# Patient Record
Sex: FEMALE | Race: WHITE | Employment: UNEMPLOYED | URBAN - METROPOLITAN AREA
[De-identification: names, ages, dates, MRNs, and addresses within clinical notes are randomized per-mention and may not be internally consistent; named-entity substitution may affect disease eponyms.]

---

## 2018-01-30 ENCOUNTER — HOSPITAL ENCOUNTER (OUTPATIENT)
Dept: MAMMOGRAPHY | Age: 63
Discharge: HOME | End: 2018-01-30
Attending: OBSTETRICS & GYNECOLOGY
Payer: MEDICARE

## 2018-01-30 DIAGNOSIS — Z12.31 VISIT FOR SCREENING MAMMOGRAM: ICD-10-CM

## 2018-01-30 PROCEDURE — 77067 SCR MAMMO BI INCL CAD: CPT

## 2019-04-19 ENCOUNTER — HOSPITAL ENCOUNTER (EMERGENCY)
Age: 64
Discharge: HOME | End: 2019-04-19
Attending: EMERGENCY MEDICINE
Payer: MEDICARE

## 2019-04-19 ENCOUNTER — APPOINTMENT (OUTPATIENT)
Dept: CT IMAGING | Age: 64
End: 2019-04-19
Attending: NURSE PRACTITIONER
Payer: MEDICARE

## 2019-04-19 VITALS
TEMPERATURE: 97.9 F | WEIGHT: 182.1 LBS | RESPIRATION RATE: 18 BRPM | DIASTOLIC BLOOD PRESSURE: 98 MMHG | OXYGEN SATURATION: 97 % | HEART RATE: 65 BPM | BODY MASS INDEX: 26.97 KG/M2 | SYSTOLIC BLOOD PRESSURE: 138 MMHG | HEIGHT: 69 IN

## 2019-04-19 DIAGNOSIS — K85.90 ACUTE PANCREATITIS WITHOUT INFECTION OR NECROSIS, UNSPECIFIED PANCREATITIS TYPE: Primary | ICD-10-CM

## 2019-04-19 LAB
ALBUMIN SERPL-MCNC: 3.7 G/DL (ref 3.5–5)
ALBUMIN/GLOB SERPL: 0.9 {RATIO} (ref 1.1–2.2)
ALP SERPL-CCNC: 89 U/L (ref 45–117)
ALT SERPL-CCNC: 21 U/L (ref 12–78)
ANION GAP SERPL CALC-SCNC: 8 MMOL/L (ref 5–15)
APPEARANCE UR: CLEAR
AST SERPL-CCNC: 13 U/L (ref 15–37)
BACTERIA URNS QL MICRO: NEGATIVE /HPF
BASOPHILS # BLD: 0 K/UL (ref 0–0.1)
BASOPHILS NFR BLD: 0 % (ref 0–1)
BILIRUB SERPL-MCNC: 0.3 MG/DL (ref 0.2–1)
BILIRUB UR QL: NEGATIVE
BUN SERPL-MCNC: 12 MG/DL (ref 6–20)
BUN/CREAT SERPL: 18 (ref 12–20)
CALCIUM SERPL-MCNC: 10.2 MG/DL (ref 8.5–10.1)
CHLORIDE SERPL-SCNC: 103 MMOL/L (ref 97–108)
CO2 SERPL-SCNC: 27 MMOL/L (ref 21–32)
COLOR UR: NORMAL
CREAT SERPL-MCNC: 0.67 MG/DL (ref 0.55–1.02)
DIFFERENTIAL METHOD BLD: ABNORMAL
EOSINOPHIL # BLD: 0.1 K/UL (ref 0–0.4)
EOSINOPHIL NFR BLD: 1 % (ref 0–7)
EPITH CASTS URNS QL MICRO: NORMAL /LPF
ERYTHROCYTE [DISTWIDTH] IN BLOOD BY AUTOMATED COUNT: 13.4 % (ref 11.5–14.5)
GLOBULIN SER CALC-MCNC: 4 G/DL (ref 2–4)
GLUCOSE SERPL-MCNC: 151 MG/DL (ref 65–100)
GLUCOSE UR STRIP.AUTO-MCNC: NEGATIVE MG/DL
HCT VFR BLD AUTO: 38.3 % (ref 35–47)
HGB BLD-MCNC: 12.5 G/DL (ref 11.5–16)
HGB UR QL STRIP: NEGATIVE
HYALINE CASTS URNS QL MICRO: NORMAL /LPF (ref 0–5)
IMM GRANULOCYTES # BLD AUTO: 0 K/UL (ref 0–0.04)
IMM GRANULOCYTES NFR BLD AUTO: 0 % (ref 0–0.5)
KETONES UR QL STRIP.AUTO: NEGATIVE MG/DL
LEUKOCYTE ESTERASE UR QL STRIP.AUTO: NEGATIVE
LIPASE SERPL-CCNC: 228 U/L (ref 73–393)
LYMPHOCYTES # BLD: 2.9 K/UL (ref 0.8–3.5)
LYMPHOCYTES NFR BLD: 38 % (ref 12–49)
MCH RBC QN AUTO: 27.6 PG (ref 26–34)
MCHC RBC AUTO-ENTMCNC: 32.6 G/DL (ref 30–36.5)
MCV RBC AUTO: 84.5 FL (ref 80–99)
MONOCYTES # BLD: 0.5 K/UL (ref 0–1)
MONOCYTES NFR BLD: 6 % (ref 5–13)
NEUTS SEG # BLD: 4.1 K/UL (ref 1.8–8)
NEUTS SEG NFR BLD: 55 % (ref 32–75)
NITRITE UR QL STRIP.AUTO: NEGATIVE
NRBC # BLD: 0 K/UL (ref 0–0.01)
NRBC BLD-RTO: 0 PER 100 WBC
PH UR STRIP: 5.5 [PH] (ref 5–8)
PLATELET # BLD AUTO: 355 K/UL (ref 150–400)
PMV BLD AUTO: 8.7 FL (ref 8.9–12.9)
POTASSIUM SERPL-SCNC: 4 MMOL/L (ref 3.5–5.1)
PROT SERPL-MCNC: 7.7 G/DL (ref 6.4–8.2)
PROT UR STRIP-MCNC: NEGATIVE MG/DL
RBC # BLD AUTO: 4.53 M/UL (ref 3.8–5.2)
RBC #/AREA URNS HPF: NORMAL /HPF (ref 0–5)
SODIUM SERPL-SCNC: 138 MMOL/L (ref 136–145)
SP GR UR REFRACTOMETRY: 1.01 (ref 1–1.03)
UA: UC IF INDICATED,UAUC: NORMAL
UROBILINOGEN UR QL STRIP.AUTO: 0.2 EU/DL (ref 0.2–1)
WBC # BLD AUTO: 7.6 K/UL (ref 3.6–11)
WBC URNS QL MICRO: NORMAL /HPF (ref 0–4)

## 2019-04-19 PROCEDURE — 74177 CT ABD & PELVIS W/CONTRAST: CPT

## 2019-04-19 PROCEDURE — 83690 ASSAY OF LIPASE: CPT

## 2019-04-19 PROCEDURE — 81001 URINALYSIS AUTO W/SCOPE: CPT

## 2019-04-19 PROCEDURE — 96361 HYDRATE IV INFUSION ADD-ON: CPT

## 2019-04-19 PROCEDURE — 96375 TX/PRO/DX INJ NEW DRUG ADDON: CPT

## 2019-04-19 PROCEDURE — 74011000250 HC RX REV CODE- 250: Performed by: NURSE PRACTITIONER

## 2019-04-19 PROCEDURE — 74011636320 HC RX REV CODE- 636/320: Performed by: EMERGENCY MEDICINE

## 2019-04-19 PROCEDURE — 96374 THER/PROPH/DIAG INJ IV PUSH: CPT

## 2019-04-19 PROCEDURE — 85025 COMPLETE CBC W/AUTO DIFF WBC: CPT

## 2019-04-19 PROCEDURE — 99283 EMERGENCY DEPT VISIT LOW MDM: CPT

## 2019-04-19 PROCEDURE — 80053 COMPREHEN METABOLIC PANEL: CPT

## 2019-04-19 PROCEDURE — 74011250636 HC RX REV CODE- 250/636: Performed by: NURSE PRACTITIONER

## 2019-04-19 PROCEDURE — 36415 COLL VENOUS BLD VENIPUNCTURE: CPT

## 2019-04-19 PROCEDURE — 74011250637 HC RX REV CODE- 250/637: Performed by: NURSE PRACTITIONER

## 2019-04-19 RX ORDER — FENTANYL CITRATE 50 UG/ML
50 INJECTION, SOLUTION INTRAMUSCULAR; INTRAVENOUS
Status: COMPLETED | OUTPATIENT
Start: 2019-04-19 | End: 2019-04-19

## 2019-04-19 RX ORDER — SODIUM CHLORIDE 0.9 % (FLUSH) 0.9 %
10 SYRINGE (ML) INJECTION
Status: COMPLETED | OUTPATIENT
Start: 2019-04-19 | End: 2019-04-19

## 2019-04-19 RX ORDER — OXYCODONE AND ACETAMINOPHEN 5; 325 MG/1; MG/1
2 TABLET ORAL
Status: COMPLETED | OUTPATIENT
Start: 2019-04-19 | End: 2019-04-19

## 2019-04-19 RX ORDER — OXYCODONE AND ACETAMINOPHEN 5; 325 MG/1; MG/1
1 TABLET ORAL
Qty: 30 TAB | Refills: 0 | Status: SHIPPED | OUTPATIENT
Start: 2019-04-19 | End: 2019-04-29

## 2019-04-19 RX ORDER — ONDANSETRON 2 MG/ML
4 INJECTION INTRAMUSCULAR; INTRAVENOUS
Status: COMPLETED | OUTPATIENT
Start: 2019-04-19 | End: 2019-04-19

## 2019-04-19 RX ORDER — ONDANSETRON 4 MG/1
4 TABLET, ORALLY DISINTEGRATING ORAL
Qty: 20 TAB | Refills: 0 | Status: SHIPPED | OUTPATIENT
Start: 2019-04-19 | End: 2019-04-25

## 2019-04-19 RX ADMIN — FAMOTIDINE 20 MG: 10 INJECTION, SOLUTION INTRAVENOUS at 20:36

## 2019-04-19 RX ADMIN — IOPAMIDOL 100 ML: 755 INJECTION, SOLUTION INTRAVENOUS at 21:38

## 2019-04-19 RX ADMIN — SODIUM CHLORIDE 1000 ML: 900 INJECTION, SOLUTION INTRAVENOUS at 20:14

## 2019-04-19 RX ADMIN — FENTANYL CITRATE 50 MCG: 50 INJECTION, SOLUTION INTRAMUSCULAR; INTRAVENOUS at 20:36

## 2019-04-19 RX ADMIN — Medication 10 ML: at 21:38

## 2019-04-19 RX ADMIN — ONDANSETRON 4 MG: 2 INJECTION INTRAMUSCULAR; INTRAVENOUS at 20:35

## 2019-04-19 RX ADMIN — OXYCODONE AND ACETAMINOPHEN 2 TABLET: 5; 325 TABLET ORAL at 22:21

## 2019-04-19 NOTE — ED TRIAGE NOTES
Pt presents from home via walk in triage c/o upper mid epigastric pain. Onset 4 days ago with burning sensation. Denies nausea or vomiting. S/p cholecysectomy, appendectomy, and hysterectomy.

## 2019-04-20 NOTE — ED PROVIDER NOTES
EMERGENCY DEPARTMENT HISTORY AND PHYSICAL EXAM 
 
 
Date: 4/19/2019 Patient Name: Willis Chavez History of Presenting Illness Chief Complaint Patient presents with  Abdominal Pain x4 days in the middle of her abdomen. Nausea with no vomiting. Still has gallbladder. Denies urinary symtpoms History Provided By: Patient HPI: Willis Chavez, 59 y.o. female with PMHx significant for diabetes, MS, tobacco use disorder, GERD, skin cancer, chronic pain, depression and anxiety, presents ambulatory to the ED with cc of epigastric abdominal pain that has been constant for the last 3 to 4 days. Patient has tried Tums for pain without relief. She states that she has never had pain like this in the past.  No recent travel or consumption of raw seafood. Associated symptoms include anorexia. Nothing makes it better nothing makes it worse. Pt denies fevers, chills, night sweats, chest pain, pressure, SOB, FRYE, PND, orthopnea,  n/v/d, melena, hematuria, dysuria, constipation, HA, dizziness, and syncope There are no other complaints, changes, or physical findings at this time. PCP: Jareth Morris., MD 
 
No current facility-administered medications on file prior to encounter. Current Outpatient Medications on File Prior to Encounter Medication Sig Dispense Refill  azithromycin (ZITHROMAX) 250 mg tablet Take 2 tabs po then one tab po q day for four days. 6 Tab 0  
 ondansetron hcl (ZOFRAN, AS HYDROCHLORIDE,) 4 mg tablet Take 1 Tab by mouth every eight (8) hours as needed for Nausea for up to 12 doses. 12 Tab 2  
 ofloxacin (FLOXIN) 0.3 % otic solution Administer 5 Drops in right ear two (2) times a day. 10 mL 0  
 FLUoxetine (PROZAC) 20 mg tablet Take 60 mg by mouth daily.  metFORMIN (GLUCOPHAGE) 500 mg tablet Take  by mouth two (2) times daily (with meals).     
 insulin glargine (LANTUS) 100 unit/mL injection by SubCUTAneous route nightly. 10 UNITS IN A.M. AND 25 UNITS AT [facility]  Liraglutide (VICTOZA) 0.6 mg/0.1 mL (18 mg/3 mL) sub-q pen 1.8 mg by SubCUTAneous route daily.  rosuvastatin (CRESTOR) 20 mg tablet Take 20 mg by mouth daily.  gabapentin (NEURONTIN) 400 mg capsule Take 800 mg by mouth two (2) times a day.  clonazepam (KLONOPIN) 2 mg tablet Take 4 mg by mouth nightly. Past History Past Medical History: 
Past Medical History:  
Diagnosis Date  Cancer ([facility] SKIN-LABIA  Chronic pain  Depression with anxiety  Diabetes ([facility]  GERD (gastroesophageal reflux disease)  Labial lesion   
 precancerous, resected  Multiple sclerosis ([facility]  Psychiatric disorder DEPRESSION/ ANXIETY  Tobacco abuse Past Surgical History: 
Past Surgical History:  
Procedure Laterality Date  HX APPENDECTOMY  HX  SECTION    
 HX CHOLECYSTECTOMY  HX HERNIA REPAIR    
 HX HYSTERECTOMY Family History: 
Family History Problem Relation Age of Onset  Heart Disease Father  Diabetes Mother  Parkinsonism Mother  COPD Mother  Breast Cancer Paternal Grandmother   
     over 48  Anesth Problems Neg Hx Social History: 
Social History Tobacco Use  Smoking status: Current Every Day Smoker Packs/day: 0.50 Years: 35.00 Pack years: 17.50 Types: Cigarettes  Smokeless tobacco: Never Used Substance Use Topics  Alcohol use: No  
 Drug use: No  
 
 
Allergies: Allergies Allergen Reactions  Pcn [Penicillins] Rash Review of Systems Review of Systems Constitutional: Negative for activity change, appetite change, chills, diaphoresis, fatigue, fever and unexpected weight change. HENT: Negative for congestion, ear pain, rhinorrhea, sinus pressure, sore throat and tinnitus. Eyes: Negative for photophobia, pain, discharge and visual disturbance. Respiratory: Negative for apnea, cough, choking, chest tightness, shortness of breath, wheezing and stridor. Cardiovascular: Negative for chest pain, palpitations and leg swelling. Gastrointestinal: Positive for abdominal distention. Negative for abdominal pain, constipation, diarrhea, nausea and vomiting. Endocrine: Negative for polydipsia, polyphagia and polyuria. Genitourinary: Negative for decreased urine volume, dyspareunia, dysuria, enuresis, flank pain, frequency, hematuria and urgency. Musculoskeletal: Negative for arthralgias, back pain, gait problem, myalgias and neck pain. Skin: Negative for color change, pallor, rash and wound. Allergic/Immunologic: Negative for immunocompromised state. Neurological: Negative for dizziness, seizures, syncope, weakness, light-headedness and headaches. Hematological: Does not bruise/bleed easily. Psychiatric/Behavioral: Negative for agitation and confusion. The patient is not nervous/anxious. Physical Exam  
Physical Exam  
Constitutional: She is oriented to person, place, and time. She appears well-developed and well-nourished. No distress. HENT:  
Head: Normocephalic. Right Ear: External ear normal.  
Left Ear: External ear normal.  
Mouth/Throat: Oropharynx is clear and moist. No oropharyngeal exudate. Eyes: Pupils are equal, round, and reactive to light. Conjunctivae and EOM are normal. Right eye exhibits no discharge. Left eye exhibits no discharge. No scleral icterus. Neck: Normal range of motion. Neck supple. No JVD present. No tracheal deviation present. No thyromegaly present. Cardiovascular: Normal rate, regular rhythm, normal heart sounds and intact distal pulses. Exam reveals no gallop and no friction rub. No murmur heard. Pulmonary/Chest: Effort normal and breath sounds normal. No stridor. No respiratory distress. She has no wheezes. She has no rales. She exhibits no tenderness. Abdominal: Soft. Bowel sounds are normal. She exhibits no distension and no mass. There is tenderness in the epigastric area. There is no rigidity, no rebound, no guarding, no tenderness at McBurney's point and negative Alexis's sign. Musculoskeletal: Normal range of motion. She exhibits no edema or tenderness. Lymphadenopathy:  
  She has no cervical adenopathy. Neurological: She is alert and oriented to person, place, and time. She displays normal reflexes. No cranial nerve deficit. Coordination normal.  
Skin: Skin is warm and dry. No rash noted. She is not diaphoretic. No erythema. No pallor. Psychiatric: She has a normal mood and affect. Her behavior is normal. Judgment and thought content normal.  
Nursing note and vitals reviewed. Diagnostic Study Results Labs - Recent Results (from the past 12 hour(s)) CBC WITH AUTOMATED DIFF Collection Time: 04/19/19  8:10 PM  
Result Value Ref Range WBC 7.6 3.6 - 11.0 K/uL  
 RBC 4.53 3.80 - 5.20 M/uL  
 HGB 12.5 11.5 - 16.0 g/dL HCT 38.3 35.0 - 47.0 % MCV 84.5 80.0 - 99.0 FL  
 MCH 27.6 26.0 - 34.0 PG  
 MCHC 32.6 30.0 - 36.5 g/dL  
 RDW 13.4 11.5 - 14.5 % PLATELET 575 403 - 065 K/uL MPV 8.7 (L) 8.9 - 12.9 FL  
 NRBC 0.0 0  WBC ABSOLUTE NRBC 0.00 0.00 - 0.01 K/uL NEUTROPHILS 55 32 - 75 % LYMPHOCYTES 38 12 - 49 % MONOCYTES 6 5 - 13 % EOSINOPHILS 1 0 - 7 % BASOPHILS 0 0 - 1 % IMMATURE GRANULOCYTES 0 0.0 - 0.5 % ABS. NEUTROPHILS 4.1 1.8 - 8.0 K/UL  
 ABS. LYMPHOCYTES 2.9 0.8 - 3.5 K/UL  
 ABS. MONOCYTES 0.5 0.0 - 1.0 K/UL  
 ABS. EOSINOPHILS 0.1 0.0 - 0.4 K/UL  
 ABS. BASOPHILS 0.0 0.0 - 0.1 K/UL  
 ABS. IMM. GRANS. 0.0 0.00 - 0.04 K/UL  
 DF AUTOMATED METABOLIC PANEL, COMPREHENSIVE Collection Time: 04/19/19  8:10 PM  
Result Value Ref Range Sodium 138 136 - 145 mmol/L Potassium 4.0 3.5 - 5.1 mmol/L Chloride 103 97 - 108 mmol/L  
 CO2 27 21 - 32 mmol/L  Anion gap 8 5 - 15 mmol/L  
 Glucose 151 (H) 65 - 100 mg/dL BUN 12 6 - 20 MG/DL Creatinine 0.67 0.55 - 1.02 MG/DL  
 BUN/Creatinine ratio 18 12 - 20 GFR est AA >60 >60 ml/min/1.73m2 GFR est non-AA >60 >60 ml/min/1.73m2 Calcium 10.2 (H) 8.5 - 10.1 MG/DL Bilirubin, total 0.3 0.2 - 1.0 MG/DL  
 ALT (SGPT) 21 12 - 78 U/L  
 AST (SGOT) 13 (L) 15 - 37 U/L Alk. phosphatase 89 45 - 117 U/L Protein, total 7.7 6.4 - 8.2 g/dL Albumin 3.7 3.5 - 5.0 g/dL Globulin 4.0 2.0 - 4.0 g/dL A-G Ratio 0.9 (L) 1.1 - 2.2 LIPASE Collection Time: 04/19/19  8:10 PM  
Result Value Ref Range Lipase 228 73 - 393 U/L  
URINALYSIS W/ REFLEX CULTURE Collection Time: 04/19/19  9:43 PM  
Result Value Ref Range Color YELLOW/STRAW Appearance CLEAR CLEAR Specific gravity 1.010 1.003 - 1.030    
 pH (UA) 5.5 5.0 - 8.0 Protein NEGATIVE  NEG mg/dL Glucose NEGATIVE  NEG mg/dL Ketone NEGATIVE  NEG mg/dL Bilirubin NEGATIVE  NEG Blood NEGATIVE  NEG Urobilinogen 0.2 0.2 - 1.0 EU/dL Nitrites NEGATIVE  NEG Leukocyte Esterase NEGATIVE  NEG    
 UA:UC IF INDICATED PENDING   
 WBC 0-4 0 - 4 /hpf  
 RBC 0-5 0 - 5 /hpf Epithelial cells FEW FEW /lpf Bacteria NEGATIVE  NEG /hpf Hyaline cast 0-2 0 - 5 /lpf Radiologic Studies -  
CT ABD PELV W CONT Final Result IMPRESSION:   
1. Minimal stranding about the head of the pancreas. Recommend clinical  
correlation for pancreatitis. 2. Incidental findings as above CT Results  (Last 48 hours) 04/19/19 2138  CT ABD PELV W CONT Final result Impression:  IMPRESSION:   
1. Minimal stranding about the head of the pancreas. Recommend clinical  
correlation for pancreatitis. 2. Incidental findings as above Narrative:  EXAM:  CT ABDOMEN PELVIS WITH CONTRAST INDICATION:  abd pain. Additional history: Midepigastric pain x4 days COMPARISON: None.   
.  
TECHNIQUE:   
 Multislice helical CT was performed from the diaphragm to the symphysis pubis  
with oral and intravenous contrast administration. Contiguous 5 mm axial images  
were reconstructed and lung and soft tissue windows were generated. Coronal and  
sagittal reformations were generated. CT dose reduction was achieved through use of a standardized protocol tailored  
for this examination and automatic exposure control for dose modulation. Emmanuel Mckenzie FINDINGS:  
INCIDENTALLY IMAGED CHEST:  
Heart/vessels: Calcifications in the right coronary artery. Lungs/Pleura: Within normal limits. Sudie Mar ABDOMEN:  
Liver: Within normal limits. Gallbladder/Biliary: Status post cholecystectomy. Spleen: Within normal limits. Pancreas: Within normal limits. Adrenals: Within normal limits. Kidneys: Within normal limits. Peritoneum/Mesenteries: Within normal limits. There are numerous surgical clips  
anteriorly in the right lower quadrant Extraperitoneum: Minimal stranding about the head of the pancreas. Gastrointestinal tract: Within normal limits. Truncated appendix with a surgical  
clip immediately adjacent. Vascular: Calcifications in the aorta. Sudie Mar PELVIS:  
Extraperitoneum: Calculi in the right side of the pelvis suggesting phleboliths. Ureters: Within normal limits. Bladder: Within normal limits. Reproductive System: Status post hysterectomy. Sudie Mar MSK:   
Degenerative changes in the lumbar spine with vacuum disc phenomenon at multiple  
levels. Small, fat-containing umbilical hernia Sudie Mar CXR Results  (Last 48 hours) None Medical Decision Making I am the first provider for this patient. I reviewed the vital signs, available nursing notes, past medical history, past surgical history, family history and social history. Vital Signs-Reviewed the patient's vital signs. Patient Vitals for the past 12 hrs: 
 Temp Pulse Resp BP SpO2 19 97.9 °F (36.6 °C) 65 18 (!) 138/98 97 % 19 97.8 °F (36.6 °C) 91 16 (!) 180/99 99 % Pulse Oximetry Analysis - 97% on RA Cardiac Monitor:  
Rate: 65 bpm 
Rhythm: Normal Sinus Rhythm Records Reviewed: Nursing Notes, Old Medical Records, Previous electrocardiograms, Previous Radiology Studies and Previous Laboratory Studies Provider Notes (Medical Decision Making):  
Epigastric discomfort Routine laboratory data, IV fluids, analgesia, Pepcid, CT abdomen pelvis Stable ambulatory patient in no acute distress ED Course:  
Initial assessment performed. The patients presenting problems have been discussed, and they are in agreement with the care plan formulated and outlined with them. I have encouraged them to ask questions as they arise throughout their visit. Critical Care Time:  
0 Disposition: 
Discharge to home with GI follow-up PLAN: 
1. Current Discharge Medication List  
  
START taking these medications Details  
ondansetron (ZOFRAN ODT) 4 mg disintegrating tablet Take 1 Tab by mouth every eight (8) hours as needed for Nausea. Qty: 20 Tab, Refills: 0 Associated Diagnoses: Acute pancreatitis without infection or necrosis, unspecified pancreatitis type  
  
oxyCODONE-acetaminophen (PERCOCET) 5-325 mg per tablet Take 1 Tab by mouth every six (6) hours as needed for Pain for up to 7 days. Max Daily Amount: 4 Tabs. Qty: 30 Tab, Refills: 0 Associated Diagnoses: Acute pancreatitis without infection or necrosis, unspecified pancreatitis type STOP taking these medications HYDROcodone-acetaminophen (NORCO) 7.5-325 mg per tablet Comments:  
Reason for Stoppin.  
Follow-up Information Follow up With Specialties Details Why Contact Info Malika Zapien MD Family Practice In 3 days   [address] [phone] 
[phone] Liz Boateng MD Gastroenterology In 3 days  [address] [facility] [address] [place] [facility] 
[phone] [facility] [place] EMERGENCY DEPT Emergency Medicine   [address] [place] [address] [address] [facility] 
[phone] Return to ED if worse Diagnosis Clinical Impression: 1. Acute pancreatitis without infection or necrosis, unspecified pancreatitis type Attestations: 
 
Dl Morris NP 
10:14 PM

## 2019-04-21 ENCOUNTER — HOSPITAL ENCOUNTER (EMERGENCY)
Age: 64
Discharge: HOME | End: 2019-04-21
Attending: EMERGENCY MEDICINE
Payer: MEDICARE

## 2019-04-21 ENCOUNTER — APPOINTMENT (OUTPATIENT)
Dept: CT IMAGING | Age: 64
End: 2019-04-21
Attending: EMERGENCY MEDICINE
Payer: MEDICARE

## 2019-04-21 VITALS
DIASTOLIC BLOOD PRESSURE: 74 MMHG | SYSTOLIC BLOOD PRESSURE: 123 MMHG | RESPIRATION RATE: 16 BRPM | HEART RATE: 76 BPM | OXYGEN SATURATION: 91 % | HEIGHT: 69 IN | TEMPERATURE: 99.3 F | BODY MASS INDEX: 26.84 KG/M2 | WEIGHT: 181.22 LBS

## 2019-04-21 DIAGNOSIS — R11.0 NAUSEA WITHOUT VOMITING: ICD-10-CM

## 2019-04-21 DIAGNOSIS — R10.84 ABDOMINAL PAIN, GENERALIZED: Primary | ICD-10-CM

## 2019-04-21 LAB
ALBUMIN SERPL-MCNC: 3.9 G/DL (ref 3.5–5)
ALBUMIN/GLOB SERPL: 0.9 {RATIO} (ref 1.1–2.2)
ALP SERPL-CCNC: 87 U/L (ref 45–117)
ALT SERPL-CCNC: 27 U/L (ref 12–78)
ANION GAP SERPL CALC-SCNC: 6 MMOL/L (ref 5–15)
AST SERPL-CCNC: 26 U/L (ref 15–37)
BASOPHILS # BLD: 0 K/UL (ref 0–0.1)
BASOPHILS NFR BLD: 0 % (ref 0–1)
BILIRUB SERPL-MCNC: 0.5 MG/DL (ref 0.2–1)
BUN SERPL-MCNC: 9 MG/DL (ref 6–20)
BUN/CREAT SERPL: 12 (ref 12–20)
CALCIUM SERPL-MCNC: 9 MG/DL (ref 8.5–10.1)
CHLORIDE SERPL-SCNC: 102 MMOL/L (ref 97–108)
CO2 SERPL-SCNC: 29 MMOL/L (ref 21–32)
CREAT SERPL-MCNC: 0.75 MG/DL (ref 0.55–1.02)
DIFFERENTIAL METHOD BLD: ABNORMAL
EOSINOPHIL # BLD: 0.1 K/UL (ref 0–0.4)
EOSINOPHIL NFR BLD: 1 % (ref 0–7)
ERYTHROCYTE [DISTWIDTH] IN BLOOD BY AUTOMATED COUNT: 13.4 % (ref 11.5–14.5)
GLOBULIN SER CALC-MCNC: 4.4 G/DL (ref 2–4)
GLUCOSE SERPL-MCNC: 147 MG/DL (ref 65–100)
HCT VFR BLD AUTO: 40.9 % (ref 35–47)
HGB BLD-MCNC: 12.9 G/DL (ref 11.5–16)
IMM GRANULOCYTES # BLD AUTO: 0 K/UL (ref 0–0.04)
IMM GRANULOCYTES NFR BLD AUTO: 0 % (ref 0–0.5)
LACTATE SERPL-SCNC: 1.1 MMOL/L (ref 0.4–2)
LIPASE SERPL-CCNC: 70 U/L (ref 73–393)
LYMPHOCYTES # BLD: 1.4 K/UL (ref 0.8–3.5)
LYMPHOCYTES NFR BLD: 20 % (ref 12–49)
MCH RBC QN AUTO: 27.3 PG (ref 26–34)
MCHC RBC AUTO-ENTMCNC: 31.5 G/DL (ref 30–36.5)
MCV RBC AUTO: 86.7 FL (ref 80–99)
MONOCYTES # BLD: 0.4 K/UL (ref 0–1)
MONOCYTES NFR BLD: 6 % (ref 5–13)
NEUTS SEG # BLD: 5.1 K/UL (ref 1.8–8)
NEUTS SEG NFR BLD: 73 % (ref 32–75)
NRBC # BLD: 0 K/UL (ref 0–0.01)
NRBC BLD-RTO: 0 PER 100 WBC
PLATELET # BLD AUTO: 339 K/UL (ref 150–400)
PMV BLD AUTO: 8.3 FL (ref 8.9–12.9)
POTASSIUM SERPL-SCNC: 4.1 MMOL/L (ref 3.5–5.1)
PROT SERPL-MCNC: 8.3 G/DL (ref 6.4–8.2)
RBC # BLD AUTO: 4.72 M/UL (ref 3.8–5.2)
SODIUM SERPL-SCNC: 137 MMOL/L (ref 136–145)
WBC # BLD AUTO: 7.1 K/UL (ref 3.6–11)

## 2019-04-21 PROCEDURE — 96376 TX/PRO/DX INJ SAME DRUG ADON: CPT

## 2019-04-21 PROCEDURE — 36415 COLL VENOUS BLD VENIPUNCTURE: CPT

## 2019-04-21 PROCEDURE — 85025 COMPLETE CBC W/AUTO DIFF WBC: CPT

## 2019-04-21 PROCEDURE — 96361 HYDRATE IV INFUSION ADD-ON: CPT

## 2019-04-21 PROCEDURE — 96374 THER/PROPH/DIAG INJ IV PUSH: CPT

## 2019-04-21 PROCEDURE — 74011636320 HC RX REV CODE- 636/320: Performed by: EMERGENCY MEDICINE

## 2019-04-21 PROCEDURE — 80053 COMPREHEN METABOLIC PANEL: CPT

## 2019-04-21 PROCEDURE — 74177 CT ABD & PELVIS W/CONTRAST: CPT

## 2019-04-21 PROCEDURE — 74011250636 HC RX REV CODE- 250/636: Performed by: EMERGENCY MEDICINE

## 2019-04-21 PROCEDURE — 83690 ASSAY OF LIPASE: CPT

## 2019-04-21 PROCEDURE — 99284 EMERGENCY DEPT VISIT MOD MDM: CPT

## 2019-04-21 PROCEDURE — 96375 TX/PRO/DX INJ NEW DRUG ADDON: CPT

## 2019-04-21 PROCEDURE — 83605 ASSAY OF LACTIC ACID: CPT

## 2019-04-21 RX ORDER — MORPHINE SULFATE 2 MG/ML
6 INJECTION, SOLUTION INTRAMUSCULAR; INTRAVENOUS
Status: COMPLETED | OUTPATIENT
Start: 2019-04-21 | End: 2019-04-21

## 2019-04-21 RX ORDER — ONDANSETRON 2 MG/ML
4 INJECTION INTRAMUSCULAR; INTRAVENOUS
Status: COMPLETED | OUTPATIENT
Start: 2019-04-21 | End: 2019-04-21

## 2019-04-21 RX ORDER — SODIUM CHLORIDE 0.9 % (FLUSH) 0.9 %
10 SYRINGE (ML) INJECTION
Status: COMPLETED | OUTPATIENT
Start: 2019-04-21 | End: 2019-04-21

## 2019-04-21 RX ORDER — OXYCODONE HYDROCHLORIDE 10 MG/1
10 TABLET ORAL
Qty: 8 TAB | Refills: 0 | Status: SHIPPED | OUTPATIENT
Start: 2019-04-21 | End: 2019-04-25

## 2019-04-21 RX ADMIN — ONDANSETRON 4 MG: 2 INJECTION INTRAMUSCULAR; INTRAVENOUS at 07:41

## 2019-04-21 RX ADMIN — IOPAMIDOL 100 ML: 755 INJECTION, SOLUTION INTRAVENOUS at 08:15

## 2019-04-21 RX ADMIN — Medication 10 ML: at 08:15

## 2019-04-21 RX ADMIN — MORPHINE SULFATE 6 MG: 2 INJECTION, SOLUTION INTRAMUSCULAR; INTRAVENOUS at 07:43

## 2019-04-21 RX ADMIN — SODIUM CHLORIDE 1000 ML: 900 INJECTION, SOLUTION INTRAVENOUS at 07:38

## 2019-04-21 RX ADMIN — MORPHINE SULFATE 6 MG: 2 INJECTION, SOLUTION INTRAMUSCULAR; INTRAVENOUS at 09:04

## 2019-04-21 NOTE — ED NOTES
Pt ambulated to restroom without difficulty. Pt back on stretcher in POC with call bell in hand and on monitor x 2. VSS.   Pt given gingerale, ok per md

## 2019-04-21 NOTE — ED PROVIDER NOTES
EMERGENCY DEPARTMENT HISTORY AND PHYSICAL EXAM 
 
 
Date: 4/21/2019 Patient Name: Pranav Lazcano History of Presenting Illness Chief Complaint Patient presents with  Pelvic Pain  Abdominal Pain History Provided By: Patient and Patient's  HPI: Pranav Lazcano, 59 y.o. female with PMHx significant for multiple sclerosis, diabetes, depression and anxiety, and acid reflux, also status post cholecystectomy and hysterectomy and appendectomy, here after emergency department visit 2 days ago where CT visualized inflammation around the head of the pancreas but with normal lipase, returns with 9 out of 10 pain in the upper abdomen and in the back as well as down the right lower quadrant. Patient relates she has been on a liquid diet since being home, Percocet initially worked to control her pain but escalated yesterday evening. Patient has been unable to sleep because of the pain, feels nauseous, no vomiting, passing gas but no BM times 2 days. Patient denies any chest pain, shortness of breath, cough, difficulty with urination, blood in her stool or from her rectum and no vomiting. There are no other complaints, changes, or physical findings at this time. Patient relays no recent changes in medications, does have a history of hyperlipidemia but is on a statin bolus. No recent falls. PCP: La Felton MD 
 
No current facility-administered medications on file prior to encounter. Current Outpatient Medications on File Prior to Encounter Medication Sig Dispense Refill  ondansetron (ZOFRAN ODT) 4 mg disintegrating tablet Take 1 Tab by mouth every eight (8) hours as needed for Nausea. 20 Tab 0  
 oxyCODONE-acetaminophen (PERCOCET) 5-325 mg per tablet Take 1 Tab by mouth every six (6) hours as needed for Pain for up to 7 days. Max Daily Amount: 4 Tabs. 30 Tab 0  
 azithromycin (ZITHROMAX) 250 mg tablet Take 2 tabs po then one tab po q day for four days.  6 Tab 0  
  ondansetron hcl (ZOFRAN, AS HYDROCHLORIDE,) 4 mg tablet Take 1 Tab by mouth every eight (8) hours as needed for Nausea for up to 12 doses. 12 Tab 2  
 ofloxacin (FLOXIN) 0.3 % otic solution Administer 5 Drops in right ear two (2) times a day. 10 mL 0  
 FLUoxetine (PROZAC) 20 mg tablet Take 60 mg by mouth daily.  metFORMIN (GLUCOPHAGE) 500 mg tablet Take  by mouth two (2) times daily (with meals).  insulin glargine (LANTUS) 100 unit/mL injection by SubCUTAneous route nightly. 10 UNITS IN A.M. AND 25 UNITS AT [facility]  Liraglutide (VICTOZA) 0.6 mg/0.1 mL (18 mg/3 mL) sub-q pen 1.8 mg by SubCUTAneous route daily.  rosuvastatin (CRESTOR) 20 mg tablet Take 20 mg by mouth daily.  gabapentin (NEURONTIN) 400 mg capsule Take 800 mg by mouth two (2) times a day.  clonazepam (KLONOPIN) 2 mg tablet Take 4 mg by mouth nightly. Past History Past Medical History: 
Past Medical History:  
Diagnosis Date  Cancer ([facility] [address] SKIN-LABIA  Chronic pain  Depression with anxiety  Diabetes ([facility] [address] [facility]  GERD (gastroesophageal reflux disease)  Labial lesion   
 precancerous, resected  [facility]  Psychiatric disorder DEPRESSION/ ANXIETY  Tobacco abuse Past Surgical History: 
Past Surgical History:  
Procedure Laterality Date  HX APPENDECTOMY  HX  SECTION    
 HX CHOLECYSTECTOMY  HX HERNIA REPAIR    
 HX HYSTERECTOMY Family History: 
Family History Problem Relation Age of Onset  Heart Disease Father  Diabetes Mother  Parkinsonism Mother  COPD Mother  Breast Cancer Paternal Grandmother   
     over 48  Anesth Problems Neg Hx Social History: 
Social History Tobacco Use  Smoking status: Current Every Day Smoker Packs/day: 0.50 Years: 35.00 Pack years: 17.50 Types: Cigarettes  Smokeless tobacco: Never Used Substance Use Topics  Alcohol use: No  
 Drug use:  No  
 Allergies: Allergies Allergen Reactions  Pcn [Penicillins] Rash Review of Systems Review of Systems Constitutional: Positive for appetite change. Negative for activity change, chills, diaphoresis, fever and unexpected weight change. HENT: Negative. Respiratory: Negative. Cardiovascular: Negative. Gastrointestinal: Positive for abdominal pain and nausea. Negative for abdominal distention, anal bleeding, blood in stool, constipation, diarrhea, rectal pain and vomiting. Endocrine: Positive for cold intolerance and polyuria. Genitourinary: Positive for flank pain. Negative for difficulty urinating. Musculoskeletal: Positive for back pain. Negative for neck pain. Allergic/Immunologic: Negative for environmental allergies and food allergies. Neurological: Negative for seizures and numbness. Psychiatric/Behavioral: Positive for behavioral problems and confusion. All other systems reviewed and are negative. Physical Exam  
Physical Exam  
Vital signs and nursing notes reviewed CONSTITUTIONAL: Alert, in no moderate distress; well-developed; well-nourished. HEAD:  Normocephalic, atraumatic EYES: PERRL; EOM's intact. Nonicteric sclera. ENTM: Nose: no rhinorrhea; Throat: no erythema or exudate, mucous membranes dry Neck:  Supple. trachea is midline. RESP: Chest clear, equal breath sounds. - W/R/R 
CV: S1 and S2 WNL; No murmurs, gallops or rubs. 2+ radial and DP pulses bilaterally. GI: non-distended, normal bowel sounds, tenderness in the upper abdomen as well as the right lower quadrant. No rebound or guarding. No overlying abrasions contusions or lacerations on the abdomen. : No costo-vertebral angle tenderness. BACK:  Non-tender, normal appearance. No overlying abrasions contusions or lacerations. UPPER EXT:  Normal inspection. no joint or soft tissue swelling LOWER EXT: No edema, no calf tenderness. NEURO: Alert and oriented x3, 5/5 strength and light touch sensation intact in bilateral upper and lower extremities. SKIN: No rashes; Warm and dry, nonjaundiced. PSYCH: Normal mood, normal affect Diagnostic Study Results Labs - Recent Results (from the past 12 hour(s)) CBC WITH AUTOMATED DIFF Collection Time: 04/21/19  7:33 AM  
Result Value Ref Range WBC 7.1 3.6 - 11.0 K/uL  
 RBC 4.72 3.80 - 5.20 M/uL  
 HGB 12.9 11.5 - 16.0 g/dL HCT 40.9 35.0 - 47.0 % MCV 86.7 80.0 - 99.0 FL  
 MCH 27.3 26.0 - 34.0 PG  
 MCHC 31.5 30.0 - 36.5 g/dL  
 RDW 13.4 11.5 - 14.5 % PLATELET 768 587 - 734 K/uL MPV 8.3 (L) 8.9 - 12.9 FL  
 NRBC 0.0 0  WBC ABSOLUTE NRBC 0.00 0.00 - 0.01 K/uL NEUTROPHILS 73 32 - 75 % LYMPHOCYTES 20 12 - 49 % MONOCYTES 6 5 - 13 % EOSINOPHILS 1 0 - 7 % BASOPHILS 0 0 - 1 % IMMATURE GRANULOCYTES 0 0.0 - 0.5 % ABS. NEUTROPHILS 5.1 1.8 - 8.0 K/UL  
 ABS. LYMPHOCYTES 1.4 0.8 - 3.5 K/UL  
 ABS. MONOCYTES 0.4 0.0 - 1.0 K/UL  
 ABS. EOSINOPHILS 0.1 0.0 - 0.4 K/UL  
 ABS. BASOPHILS 0.0 0.0 - 0.1 K/UL  
 ABS. IMM. GRANS. 0.0 0.00 - 0.04 K/UL  
 DF AUTOMATED METABOLIC PANEL, COMPREHENSIVE Collection Time: 04/21/19  7:33 AM  
Result Value Ref Range Sodium 137 136 - 145 mmol/L Potassium 4.1 3.5 - 5.1 mmol/L Chloride 102 97 - 108 mmol/L  
 CO2 29 21 - 32 mmol/L Anion gap 6 5 - 15 mmol/L Glucose 147 (H) 65 - 100 mg/dL BUN 9 6 - 20 MG/DL Creatinine 0.75 0.55 - 1.02 MG/DL  
 BUN/Creatinine ratio 12 12 - 20 GFR est AA >60 >60 ml/min/1.73m2 GFR est non-AA >60 >60 ml/min/1.73m2 Calcium 9.0 8.5 - 10.1 MG/DL Bilirubin, total 0.5 0.2 - 1.0 MG/DL  
 ALT (SGPT) 27 12 - 78 U/L  
 AST (SGOT) 26 15 - 37 U/L Alk. phosphatase 87 45 - 117 U/L Protein, total 8.3 (H) 6.4 - 8.2 g/dL Albumin 3.9 3.5 - 5.0 g/dL Globulin 4.4 (H) 2.0 - 4.0 g/dL A-G Ratio 0.9 (L) 1.1 - 2.2 LIPASE  Collection Time: 04/21/19  7:33 AM  
 Result Value Ref Range Lipase 70 (L) 73 - 393 U/L  
LACTIC ACID Collection Time: 04/21/19  7:33 AM  
Result Value Ref Range Lactic acid 1.1 0.4 - 2.0 MMOL/L Radiologic Studies -  
CT ABD PELV W CONT Final Result IMPRESSION: No acute process in the abdomen and pelvis. CT Results  (Last 48 hours) 04/21/19 0814  CT ABD PELV W CONT Final result Impression:  IMPRESSION: No acute process in the abdomen and pelvis. Narrative:  CT ABDOMEN AND PELVIS WITH CONTRAST. 4/21/2019 8:14 AM   
   
INDICATION: Abdominal pain, fever. Acute epigastric abdominal pain and back  
pain. COMPARISON: None. TECHNIQUE: CT of the abdomen and pelvis was performed after the administration 100 cc IV [id number]. CT dose reduction was achieved through use of a  
standardized protocol tailored for this examination and automatic exposure  
control for dose modulation. FINDINGS:  
Abdomen: Post cholecystectomy. The lung bases are clear. The heart size is  
normal. Right coronary artery calcifications. The distal esophagus, stomach,  
duodenum, liver, pancreas, spleen, adrenals, and kidneys are normal.  
   
Pelvis: Post appendectomy and hysterectomy. The small bowel, ileocecal junction,  
colon, and bladder are normal. No free air or fluid, and no abdominopelvic  
lymphadenopathy. 04/19/19 2138  CT ABD PELV W CONT Final result Impression:  IMPRESSION:   
1. Minimal stranding about the head of the pancreas. Recommend clinical  
correlation for pancreatitis. 2. Incidental findings as above Narrative:  EXAM:  CT ABDOMEN PELVIS WITH CONTRAST INDICATION:  abd pain. Additional history: Midepigastric pain x4 days COMPARISON: None. .  
TECHNIQUE:   
Multislice helical CT was performed from the diaphragm to the symphysis pubis  
with oral and intravenous contrast administration. Contiguous 5 mm axial images were reconstructed and lung and soft tissue windows were generated. Coronal and  
sagittal reformations were generated. CT dose reduction was achieved through use of a standardized protocol tailored  
for this examination and automatic exposure control for dose modulation. [facility] FINDINGS:  
INCIDENTALLY IMAGED CHEST:  
Heart/vessels: Calcifications in the right coronary artery. Lungs/Pleura: Within normal limits. Marilynne Ruts ABDOMEN:  
Liver: Within normal limits. Gallbladder/Biliary: Status post cholecystectomy. Spleen: Within normal limits. Pancreas: Within normal limits. Adrenals: Within normal limits. Kidneys: Within normal limits. Peritoneum/Mesenteries: Within normal limits. There are numerous surgical clips  
anteriorly in the right lower quadrant Extraperitoneum: Minimal stranding about the head of the pancreas. Gastrointestinal tract: Within normal limits. Truncated appendix with a surgical  
clip immediately adjacent. Vascular: Calcifications in the aorta. [facility] PEL[facility]:  
Extraperitoneum: Calculi in the right side of the pelvis suggesting phleboliths. Ureters: Within normal limits. Bladder: Within normal limits. Reproductive System: Status post hysterectomy. [facility] MSK:   
Degenerative changes in the lumbar spine with vacuum disc phenomenon at multiple  
levels. Small, fat-containing umbilical hernia [facility] CXR Results  (Last 48 hours) None Medical Decision Making I am the first provider for this patient. I reviewed the vital signs, available nursing notes, past medical history, past surgical history, family history and social history. Vital Signs-Reviewed the patient's vital signs. Patient Vitals for the past 12 hrs: 
 Temp Pulse Resp BP SpO2  
04/21/19 1002    136/88 90 % 04/21/19 0912 99.3 °F (37.4 °C) 76 16 123/72 99 % 04/21/19 0835    127/70 99 % 04/21/19 0709 100 °F (37.8 °C) 90 16 141/80 97 % Pulse Oximetry Analysis -97% on room air. Cardiac Monitor:  
Rate: 90 bpm 
Rhythm: Normal Sinus Rhythm Records Reviewed: Nursing Notes, Old Medical Records and Previous Radiology Studies and previous laboratory studies. Provider Notes (Medical Decision Making):  
80-year-old female with inflammation around the pancreas on CT 2 days ago with worsening pain and no pain control with outside pain medications along with nausea today. Will recheck labs given normal pancreatitis numbers 2 days ago, no clinical signs of biliary obstruction. Will provide pain, nausea medication and IV fluid therapy. Patient relates that she has no circulation issues including peripheral arterial disease. Should consider alternative diagnosis including enteritis colitis free fluid, abscess formation, less likely mesenteric ischemia. ED Course:  
Initial assessment performed. The patients presenting problems have been discussed, and they are in agreement with the care plan formulated and outlined with them. I have encouraged them to ask questions as they arise throughout their visit. ED Course as of Apr 21 1041 Sun Apr 21, 2019  
5335 Went to the bedside to check on patient's pain. Stated it was currently a 5, got as low as a 3 out of 10 with the morphine but seems to be escalating again. Will re-dose morphine. Awaiting lab results and CT findings. Patient does not need anything else besides pain medication at this time. [TL] 7784 Patient states she is currently pain-free. Is getting up to the bathroom with the tach, will p.o. challenge with ginger ale. [TL] ED Course User Index [TL] Malaika Malhotra MD  
 
10:37 AM 
Patient continues to be pain-free and now is nausea free. We will continue to observe till approximately 11 AM.  If patient still feeling well plan for discharge home. 10:59am: Patient continues to be pain and nausea free. Disposition: 
Discharge Discharge Note: 
11:00 AM 
 The pt is ready for discharge. The pt's signs, symptoms, diagnosis, and discharge instructions have been discussed and pt has conveyed their understanding. The pt is to follow up as recommended or return to ER should their symptoms worsen. Plan has been discussed and pt is in agreement. PLAN: 
1. Current Discharge Medication List  
  
START taking these medications Details  
oxyCODONE IR (ROXICODONE) 10 mg tab immediate release tablet Take 1 Tab by mouth every six (6) hours as needed for Pain for up to 8 doses. Max Daily Amount: 40 mg. 
Qty: 8 Tab, Refills: 0 Associated Diagnoses: Abdominal pain, generalized 2. Follow-up Information Follow up With Specialties Details Why Contact Info [facility] EMERGENCY DEPT Emergency Medicine  If symptoms worsen including fever, worsening abdominal pain despite medication, uncontrolled vomiting, new chest pain or shortness of breath or other new concerning symptoms. [address] [address]  [place] [facility] 
[phone] Return to ED if worse Diagnosis Clinical Impression: 1. Abdominal pain, generalized 2. Nausea without vomiting

## 2019-04-21 NOTE — ED TRIAGE NOTES
Assumed care of pt from triage. Pt is A&O x 4. Pt reports CC of abdominal pain x 3 days. Pt was seen here on 4/19 for same symptoms. Pt denies any urinary symptoms. Pt reports N/V denies any diarrhea/ CP or SOB. Pt placed on monitor x 2. VSS. Dr Jessee Walters at bedside evaluating pt.

## 2019-04-21 NOTE — ED NOTES
Pt discharged by Dr Anastasia Johnson. Pt provided with discharge instructions Rx and instructions on follow up care. Pt out of ED ambulatory without difficulty accompanied by family.

## 2019-04-21 NOTE — DISCHARGE INSTRUCTIONS

## 2019-04-25 ENCOUNTER — HOSPITAL ENCOUNTER (INPATIENT)
Age: 64
LOS: 3 days | Discharge: HOME | DRG: 440 | End: 2019-04-29
Attending: EMERGENCY MEDICINE | Admitting: INTERNAL MEDICINE
Payer: MEDICARE

## 2019-04-25 DIAGNOSIS — R10.13 ABDOMINAL PAIN, EPIGASTRIC: Primary | ICD-10-CM

## 2019-04-25 PROBLEM — R10.9 ABDOMINAL PAIN: Status: ACTIVE | Noted: 2019-04-25

## 2019-04-25 LAB
ALBUMIN SERPL-MCNC: 4 G/DL (ref 3.5–5)
ALBUMIN/GLOB SERPL: 0.9 {RATIO} (ref 1.1–2.2)
ALP SERPL-CCNC: 120 U/L (ref 45–117)
ALT SERPL-CCNC: 58 U/L (ref 12–78)
ANION GAP SERPL CALC-SCNC: 6 MMOL/L (ref 5–15)
AST SERPL-CCNC: 37 U/L (ref 15–37)
BASOPHILS # BLD: 0 K/UL (ref 0–0.1)
BASOPHILS NFR BLD: 0 % (ref 0–1)
BILIRUB SERPL-MCNC: 0.3 MG/DL (ref 0.2–1)
BUN SERPL-MCNC: 10 MG/DL (ref 6–20)
BUN/CREAT SERPL: 14 (ref 12–20)
CALCIUM SERPL-MCNC: 9.1 MG/DL (ref 8.5–10.1)
CHLORIDE SERPL-SCNC: 103 MMOL/L (ref 97–108)
CO2 SERPL-SCNC: 25 MMOL/L (ref 21–32)
CREAT SERPL-MCNC: 0.71 MG/DL (ref 0.55–1.02)
DIFFERENTIAL METHOD BLD: NORMAL
EOSINOPHIL # BLD: 0.1 K/UL (ref 0–0.4)
EOSINOPHIL NFR BLD: 1 % (ref 0–7)
ERYTHROCYTE [DISTWIDTH] IN BLOOD BY AUTOMATED COUNT: 13.5 % (ref 11.5–14.5)
GLOBULIN SER CALC-MCNC: 4.5 G/DL (ref 2–4)
GLUCOSE BLD STRIP.AUTO-MCNC: 74 MG/DL (ref 65–100)
GLUCOSE BLD STRIP.AUTO-MCNC: 90 MG/DL (ref 65–100)
GLUCOSE SERPL-MCNC: 168 MG/DL (ref 65–100)
HCT VFR BLD AUTO: 40.1 % (ref 35–47)
HGB BLD-MCNC: 13.1 G/DL (ref 11.5–16)
IMM GRANULOCYTES # BLD AUTO: 0 K/UL (ref 0–0.04)
IMM GRANULOCYTES NFR BLD AUTO: 0 % (ref 0–0.5)
LIPASE SERPL-CCNC: 59 U/L (ref 73–393)
LYMPHOCYTES # BLD: 2.9 K/UL (ref 0.8–3.5)
LYMPHOCYTES NFR BLD: 35 % (ref 12–49)
MAGNESIUM SERPL-MCNC: 1.8 MG/DL (ref 1.6–2.4)
MCH RBC QN AUTO: 27.8 PG (ref 26–34)
MCHC RBC AUTO-ENTMCNC: 32.7 G/DL (ref 30–36.5)
MCV RBC AUTO: 85.1 FL (ref 80–99)
MONOCYTES # BLD: 0.4 K/UL (ref 0–1)
MONOCYTES NFR BLD: 5 % (ref 5–13)
NEUTS SEG # BLD: 4.7 K/UL (ref 1.8–8)
NEUTS SEG NFR BLD: 59 % (ref 32–75)
NRBC # BLD: 0 K/UL (ref 0–0.01)
NRBC BLD-RTO: 0 PER 100 WBC
PLATELET # BLD AUTO: 391 K/UL (ref 150–400)
PMV BLD AUTO: 8.9 FL (ref 8.9–12.9)
POTASSIUM SERPL-SCNC: 4.4 MMOL/L (ref 3.5–5.1)
PROT SERPL-MCNC: 8.5 G/DL (ref 6.4–8.2)
RBC # BLD AUTO: 4.71 M/UL (ref 3.8–5.2)
SERVICE CMNT-IMP: NORMAL
SERVICE CMNT-IMP: NORMAL
SODIUM SERPL-SCNC: 134 MMOL/L (ref 136–145)
WBC # BLD AUTO: 8.1 K/UL (ref 3.6–11)

## 2019-04-25 PROCEDURE — 74011250636 HC RX REV CODE- 250/636: Performed by: NURSE PRACTITIONER

## 2019-04-25 PROCEDURE — 36415 COLL VENOUS BLD VENIPUNCTURE: CPT

## 2019-04-25 PROCEDURE — 99285 EMERGENCY DEPT VISIT HI MDM: CPT

## 2019-04-25 PROCEDURE — 80053 COMPREHEN METABOLIC PANEL: CPT

## 2019-04-25 PROCEDURE — 74011250636 HC RX REV CODE- 250/636

## 2019-04-25 PROCEDURE — C9113 INJ PANTOPRAZOLE SODIUM, VIA: HCPCS | Performed by: NURSE PRACTITIONER

## 2019-04-25 PROCEDURE — 96374 THER/PROPH/DIAG INJ IV PUSH: CPT

## 2019-04-25 PROCEDURE — 74011000250 HC RX REV CODE- 250: Performed by: HOSPITALIST

## 2019-04-25 PROCEDURE — 74011250636 HC RX REV CODE- 250/636: Performed by: EMERGENCY MEDICINE

## 2019-04-25 PROCEDURE — 99218 HC RM OBSERVATION: CPT

## 2019-04-25 PROCEDURE — 74011250637 HC RX REV CODE- 250/637: Performed by: HOSPITALIST

## 2019-04-25 PROCEDURE — 96375 TX/PRO/DX INJ NEW DRUG ADDON: CPT

## 2019-04-25 PROCEDURE — 83690 ASSAY OF LIPASE: CPT

## 2019-04-25 PROCEDURE — 74011000250 HC RX REV CODE- 250: Performed by: NURSE PRACTITIONER

## 2019-04-25 PROCEDURE — 85025 COMPLETE CBC W/AUTO DIFF WBC: CPT

## 2019-04-25 PROCEDURE — 82962 GLUCOSE BLOOD TEST: CPT

## 2019-04-25 PROCEDURE — 74011250636 HC RX REV CODE- 250/636: Performed by: HOSPITALIST

## 2019-04-25 PROCEDURE — 83735 ASSAY OF MAGNESIUM: CPT

## 2019-04-25 RX ORDER — OXYCODONE HYDROCHLORIDE 10 MG/1
10 TABLET ORAL
COMMUNITY
End: 2019-06-15

## 2019-04-25 RX ORDER — DEXTROSE 50 % IN WATER (D50W) INTRAVENOUS SYRINGE
12.5-25 AS NEEDED
Status: DISCONTINUED | OUTPATIENT
Start: 2019-04-25 | End: 2019-04-29 | Stop reason: HOSPADM

## 2019-04-25 RX ORDER — ACETAMINOPHEN 325 MG/1
650 TABLET ORAL
Status: DISCONTINUED | OUTPATIENT
Start: 2019-04-25 | End: 2019-04-29 | Stop reason: HOSPADM

## 2019-04-25 RX ORDER — CLONAZEPAM 1 MG/1
4 TABLET ORAL
Status: DISCONTINUED | OUTPATIENT
Start: 2019-04-25 | End: 2019-04-29 | Stop reason: HOSPADM

## 2019-04-25 RX ORDER — MORPHINE SULFATE 4 MG/ML
INJECTION INTRAVENOUS
Status: COMPLETED
Start: 2019-04-25 | End: 2019-04-25

## 2019-04-25 RX ORDER — SODIUM CHLORIDE, SODIUM LACTATE, POTASSIUM CHLORIDE, CALCIUM CHLORIDE 600; 310; 30; 20 MG/100ML; MG/100ML; MG/100ML; MG/100ML
125 INJECTION, SOLUTION INTRAVENOUS CONTINUOUS
Status: DISCONTINUED | OUTPATIENT
Start: 2019-04-26 | End: 2019-04-29

## 2019-04-25 RX ORDER — MAGNESIUM SULFATE 100 %
4 CRYSTALS MISCELLANEOUS AS NEEDED
Status: DISCONTINUED | OUTPATIENT
Start: 2019-04-25 | End: 2019-04-29 | Stop reason: HOSPADM

## 2019-04-25 RX ORDER — DIPHENHYDRAMINE HYDROCHLORIDE 50 MG/ML
25 INJECTION, SOLUTION INTRAMUSCULAR; INTRAVENOUS
Status: COMPLETED | OUTPATIENT
Start: 2019-04-25 | End: 2019-04-25

## 2019-04-25 RX ORDER — ATORVASTATIN CALCIUM 40 MG/1
40 TABLET, FILM COATED ORAL DAILY
COMMUNITY
End: 2021-04-27

## 2019-04-25 RX ORDER — METOCLOPRAMIDE HYDROCHLORIDE 5 MG/ML
10 INJECTION INTRAMUSCULAR; INTRAVENOUS
Status: COMPLETED | OUTPATIENT
Start: 2019-04-25 | End: 2019-04-25

## 2019-04-25 RX ORDER — MORPHINE SULFATE 2 MG/ML
2 INJECTION, SOLUTION INTRAMUSCULAR; INTRAVENOUS
Status: DISCONTINUED | OUTPATIENT
Start: 2019-04-25 | End: 2019-04-26

## 2019-04-25 RX ORDER — GABAPENTIN 300 MG/1
600 CAPSULE ORAL 2 TIMES DAILY
Status: DISCONTINUED | OUTPATIENT
Start: 2019-04-25 | End: 2019-04-29 | Stop reason: HOSPADM

## 2019-04-25 RX ORDER — MORPHINE SULFATE 2 MG/ML
4 INJECTION, SOLUTION INTRAMUSCULAR; INTRAVENOUS ONCE
Status: COMPLETED | OUTPATIENT
Start: 2019-04-25 | End: 2019-04-25

## 2019-04-25 RX ORDER — MORPHINE SULFATE 2 MG/ML
INJECTION, SOLUTION INTRAMUSCULAR; INTRAVENOUS
Status: COMPLETED
Start: 2019-04-25 | End: 2019-04-25

## 2019-04-25 RX ORDER — SODIUM CHLORIDE 0.9 % (FLUSH) 0.9 %
5-40 SYRINGE (ML) INJECTION AS NEEDED
Status: DISCONTINUED | OUTPATIENT
Start: 2019-04-25 | End: 2019-04-29 | Stop reason: HOSPADM

## 2019-04-25 RX ORDER — SODIUM CHLORIDE 0.9 % (FLUSH) 0.9 %
5-40 SYRINGE (ML) INJECTION EVERY 8 HOURS
Status: DISCONTINUED | OUTPATIENT
Start: 2019-04-25 | End: 2019-04-29 | Stop reason: HOSPADM

## 2019-04-25 RX ORDER — ONDANSETRON 2 MG/ML
8 INJECTION INTRAMUSCULAR; INTRAVENOUS
Status: COMPLETED | OUTPATIENT
Start: 2019-04-25 | End: 2019-04-25

## 2019-04-25 RX ORDER — SODIUM CHLORIDE, SODIUM LACTATE, POTASSIUM CHLORIDE, CALCIUM CHLORIDE 600; 310; 30; 20 MG/100ML; MG/100ML; MG/100ML; MG/100ML
250 INJECTION, SOLUTION INTRAVENOUS CONTINUOUS
Status: DISPENSED | OUTPATIENT
Start: 2019-04-25 | End: 2019-04-26

## 2019-04-25 RX ORDER — INSULIN LISPRO 100 [IU]/ML
INJECTION, SOLUTION INTRAVENOUS; SUBCUTANEOUS EVERY 6 HOURS
Status: DISCONTINUED | OUTPATIENT
Start: 2019-04-25 | End: 2019-04-29 | Stop reason: HOSPADM

## 2019-04-25 RX ORDER — ONDANSETRON 2 MG/ML
4 INJECTION INTRAMUSCULAR; INTRAVENOUS
Status: DISCONTINUED | OUTPATIENT
Start: 2019-04-25 | End: 2019-04-29 | Stop reason: HOSPADM

## 2019-04-25 RX ORDER — LISINOPRIL 5 MG/1
5 TABLET ORAL DAILY
COMMUNITY
End: 2019-04-29

## 2019-04-25 RX ORDER — FLUOXETINE HYDROCHLORIDE 20 MG/1
60 CAPSULE ORAL
Status: DISCONTINUED | OUTPATIENT
Start: 2019-04-25 | End: 2019-04-28 | Stop reason: CLARIF

## 2019-04-25 RX ORDER — FAMOTIDINE 10 MG/ML
20 INJECTION INTRAVENOUS
Status: COMPLETED | OUTPATIENT
Start: 2019-04-25 | End: 2019-04-25

## 2019-04-25 RX ORDER — ATORVASTATIN CALCIUM 40 MG/1
40 TABLET, FILM COATED ORAL DAILY
Status: DISCONTINUED | OUTPATIENT
Start: 2019-04-26 | End: 2019-04-29 | Stop reason: HOSPADM

## 2019-04-25 RX ORDER — HYDROMORPHONE HYDROCHLORIDE 1 MG/ML
1 INJECTION, SOLUTION INTRAMUSCULAR; INTRAVENOUS; SUBCUTANEOUS ONCE
Status: COMPLETED | OUTPATIENT
Start: 2019-04-25 | End: 2019-04-25

## 2019-04-25 RX ORDER — NICOTINE 7MG/24HR
1 PATCH, TRANSDERMAL 24 HOURS TRANSDERMAL DAILY
Status: DISCONTINUED | OUTPATIENT
Start: 2019-04-26 | End: 2019-04-25

## 2019-04-25 RX ORDER — NICOTINE 7MG/24HR
1 PATCH, TRANSDERMAL 24 HOURS TRANSDERMAL DAILY
Status: DISCONTINUED | OUTPATIENT
Start: 2019-04-25 | End: 2019-04-29 | Stop reason: HOSPADM

## 2019-04-25 RX ADMIN — FAMOTIDINE 20 MG: 10 INJECTION, SOLUTION INTRAVENOUS at 13:08

## 2019-04-25 RX ADMIN — MORPHINE SULFATE 2 MG: 4 INJECTION INTRAVENOUS at 20:03

## 2019-04-25 RX ADMIN — SODIUM CHLORIDE, SODIUM LACTATE, POTASSIUM CHLORIDE, AND CALCIUM CHLORIDE 250 ML/HR: 600; 310; 30; 20 INJECTION, SOLUTION INTRAVENOUS at 18:20

## 2019-04-25 RX ADMIN — CLONAZEPAM 4 MG: 1 TABLET ORAL at 23:58

## 2019-04-25 RX ADMIN — MORPHINE SULFATE 4 MG: 2 INJECTION, SOLUTION INTRAMUSCULAR; INTRAVENOUS at 13:08

## 2019-04-25 RX ADMIN — SODIUM CHLORIDE 1000 ML: 900 INJECTION, SOLUTION INTRAVENOUS at 12:23

## 2019-04-25 RX ADMIN — METOCLOPRAMIDE 10 MG: 5 INJECTION, SOLUTION INTRAMUSCULAR; INTRAVENOUS at 15:12

## 2019-04-25 RX ADMIN — PROCHLORPERAZINE EDISYLATE 5 MG: 5 INJECTION INTRAMUSCULAR; INTRAVENOUS at 20:02

## 2019-04-25 RX ADMIN — ONDANSETRON 8 MG: 2 INJECTION INTRAMUSCULAR; INTRAVENOUS at 12:23

## 2019-04-25 RX ADMIN — MORPHINE SULFATE 2 MG: 2 INJECTION, SOLUTION INTRAMUSCULAR; INTRAVENOUS at 23:59

## 2019-04-25 RX ADMIN — Medication 16 G: at 23:58

## 2019-04-25 RX ADMIN — HYDROMORPHONE HYDROCHLORIDE 1 MG: 1 INJECTION, SOLUTION INTRAMUSCULAR; INTRAVENOUS; SUBCUTANEOUS at 15:12

## 2019-04-25 RX ADMIN — SODIUM CHLORIDE, SODIUM LACTATE, POTASSIUM CHLORIDE, AND CALCIUM CHLORIDE 250 ML/HR: 600; 310; 30; 20 INJECTION, SOLUTION INTRAVENOUS at 16:59

## 2019-04-25 RX ADMIN — GABAPENTIN 600 MG: 300 CAPSULE ORAL at 20:03

## 2019-04-25 RX ADMIN — PANTOPRAZOLE SODIUM 40 MG: 40 INJECTION, POWDER, FOR SOLUTION INTRAVENOUS at 21:15

## 2019-04-25 RX ADMIN — Medication 10 ML: at 21:16

## 2019-04-25 RX ADMIN — DIPHENHYDRAMINE HYDROCHLORIDE 25 MG: 50 INJECTION, SOLUTION INTRAMUSCULAR; INTRAVENOUS at 15:12

## 2019-04-25 NOTE — CONSULTS
[address] [address] Katarina Weber  Herb Villaseñor              GI CONSULTATION NOTE  Carmina Nava, RADHA  [phone] NP in-hospital cell phone M-F until 4:30  After 5pm or on weekends, please call  for physician on call    NAME: Magdaleon Calderon   :  1955   MRN:  [id number]     Primary GI: Dr Tamara Brizuela    Date/Time:  2019 4:13 PM  Assessment:   Intractable abdominal pain with nausea and vomiting - onset 4/15/19 with 2 previous ER visits   - Epigastric sharp pain, non-radiating to back but radiates to RUQ area which is a burning pain  - ? Pancreatitis on initial CT scan but normal lipase of 228 on   - Normal appearing pancreas on repeat CT scan and normal lipase of 70 on   - Normal lipase today at 51  - No hx of PUD but had gastritis in . No daily PPI. Diabetes Mellitus  Anxiety    Concern for symptomatic pancreatitis although did not meet criteria initially due to lipase not being 3x upper limits of normal but may have been more elevated prior to initial hospital and now may have persistent symptoms. Also concerned for PUD, gastritis, lesion unlikely with normal CT scan and no weight loss    Plan:   - Conservative therapy at this time. No GI intervention planned, if no better on Monday, may consider completing EGD. Will treat for presumed pancreatitis. - NPO tonight, may increase to CLD tomorrow  - IV fluids - aggressive 250ml/hr overnight, will lower in AM  - supportive therapy including antiemetic and analgesics  - PPI BID  -No benefit in serial lipase      Thank you for consultation  Plan discussed with Dr Bruce Goodwin:     HISTORY OF PRESENT ILLNESS:     Magdaleno Calderon is an 59 y.o.  female who we are asked to see for complaint of intractable abdominal pain, diarrhea, nausea, and vomiting. Medical history includes GERD, diabetes, MS, depression. She presented to our office today for follow up from ER visits.  19, she presented to ER for abdominal pain x 4 days in epigastric region. Ct scan noted to have minimal stranding in pancreas but had normal lipase of 228 and normal WBC. She was discharged home and follow a bland and liquid diet but pain was so sever and persistent, she returned to ER on 19. This time she had a normal CT scan, lipase, and WBC but continued pain. Again was discharged and continued to follow liquid diet. He reports having lots of issues with blood sugars ranging from  which is very rare for her as she is a compliant diabetic. Due to ill appearing in office, we sent her here for further evaluation and possible admission as she felt she couldn't go home. Labs today are normal again with no repeat imaging. She continues to complain of sharp epigastric pain that moves around RUQ which turns to burning sensation. Currently on dilaudid which has helped to relieve her discomfort. No melena, hematochezia. No diarrhea today. FOBT negative in ER. Last EGD  noted hiatal hernia and gastritis  Last colonoscopy 2018 by Dr Marion Mins - normal per patient.        Past Medical History:   Diagnosis Date    Cancer ([address]     SKIN-LABIA    Chronic pain     Depression with anxiety     Diabetes (HCC)     GERD (gastroesophageal reflux disease)     Labial lesion     precancerous, resected    Multiple sclerosis (HCC)     Psychiatric disorder     DEPRESSION/ ANXIETY    Tobacco abuse       Past Surgical History:   Procedure Laterality Date    HX APPENDECTOMY      HX  SECTION      HX CHOLECYSTECTOMY      HX HERNIA REPAIR      HX HYSTERECTOMY       Social History     Tobacco Use    Smoking status: Current Every Day Smoker     Packs/day: 0.50     Years: 35.00     Pack years: 17.50     Types: Cigarettes    Smokeless tobacco: Never Used   Substance Use Topics    Alcohol use: No      Family History   Problem Relation Age of Onset    Heart Disease Father     Diabetes Mother     Parkinsonism Mother  COPD Mother     Breast Cancer Paternal Grandmother         over 48   [facility] Anesth Problems Neg Hx       Allergies   Allergen Reactions    Pcn [Penicillins] Rash      Prior to Admission medications    Medication Sig Start Date End Date Taking? Authorizing Provider   ondansetron (ZOFRAN ODT) 4 mg disintegrating tablet Take 1 Tab by mouth every eight (8) hours as needed for Nausea. 4/19/19  Yes Mark Berkowitz NP   oxyCODONE-acetaminophen (PERCOCET) 5-325 mg per tablet Take 1 Tab by mouth every six (6) hours as needed for Pain for up to 7 days. Max Daily Amount: 4 Tabs. 4/19/19 4/26/19 Yes Mark Berkowitz NP   azithromycin (ZITHROMAX) 250 mg tablet Take 2 tabs po then one tab po q day for four days. 2/19/15  Yes Michail Seip, MD   ondansetron hcl (ZOFRAN, AS HYDROCHLORIDE,) 4 mg tablet Take 1 Tab by mouth every eight (8) hours as needed for Nausea for up to 12 doses. 2/19/15  Yes Michail Seip, MD   oxyCODONE IR (ROXICODONE) 10 mg tab immediate release tablet Take 1 Tab by mouth every six (6) hours as needed for Pain for up to 8 doses. Max Daily Amount: 40 mg. 4/21/19 4/24/19  Marbella Klein MD   ofloxacin (FLOXIN) 0.3 % otic solution Administer 5 Drops in right ear two (2) times a day. 2/19/15   Michail Seip, MD   FLUoxetine (PROZAC) 20 mg tablet Take 60 mg by mouth daily. Provider, Historical   metFORMIN (GLUCOPHAGE) 500 mg tablet Take  by mouth two (2) times daily (with meals). Provider, Historical   insulin glargine (LANTUS) 100 unit/mL injection by SubCUTAneous route nightly. 10 UNITS IN A.M. AND 25 UNITS AT [facility]    Provider, Historical   Liraglutide (VICTOZA) 0.6 mg/0.1 mL (18 mg/3 mL) sub-q pen 1.8 mg by SubCUTAneous route daily. Provider, Historical   rosuvastatin (CRESTOR) 20 mg tablet Take 20 mg by mouth daily. Provider, Historical   gabapentin (NEURONTIN) 400 mg capsule Take 800 mg by mouth two (2) times a day.     Provider, Historical   clonazepam (KLONOPIN) 2 mg tablet Take 4 mg by mouth nightly. Provider, Historical       Patient Active Problem List   Diagnosis Code    Acute asthmatic bronchitis J45.909    Uncontrolled type 2 diabetes mellitus with polyneuropathy ([facility]) E11.42, E11.65    Anxiety state, unspecified F41.1    Chronic otitis externa of right ear H60.61       REVIEW OF SYSTEMS:    Constitutional: negative fever, negative chills, negative weight loss  Eyes:   negative visual changes  ENT:   negative sore throat, tongue or lip swelling   Respiratory:  negative cough, negative dyspnea  Cards:  negative for chest pain, palpitations, lower extremity edema  GI:   See HPI  :  negative for frequency, dysuria  Integument:  negative for rash and pruritus  Heme:  negative for easy bruising and gum/nose bleeding  Musculoskel: negative for myalgias,  back pain  Neuro: negative for headaches, dizziness, vertigo  Psych:          negative for feelings of anxiety, depression     Pertinent Positives: general malaise, weakness      Objective:   VITALS:    Visit Vitals  /85   Pulse 77   Temp 99 °F (37.2 °C)   Resp 17   Ht 5' 9\" (1.753 m)   Wt 81.3 kg (179 lb 3.7 oz)   SpO2 95%   BMI 26.47 kg/m²       PHYSICAL EXAM:   General:          Alert, WD, WN, cooperative, no distress, appears stated age. Head:               Normocephalic, without obvious abnormality, atraumatic. Eyes:               Conjunctivae clear and pale, anicteric sclerae. Pupils are equal  Nose:               Nares normal. No drainage or sinus tenderness. Throat:             Lips, mucosa, and tongue normal.   Neck:               Supple, symmetrical,  no adenopathy,   Back:               Symmetric,  No CVA tenderness. Lungs:             CTA bilaterally. No wheezing/rhonchi/rales. Chest wall:      No tenderness or deformity. No Accessory muscle use. Heart:              Regular rate and rhythm,  no murmur, rub or gallop. Abdomen:        Epigastric TTP, otherwise non-tender. Soft. Not distended.   Bowel sounds normal. No masses  Extremities:     Atraumatic, No cyanosis. No edema. No clubbing  Skin:                Texture, turgor normal. No rashes/lesions/jaundice  Lymph:            Cervical, supraclavicular normal.  Psych:             Good insight. Not depressed. Not anxious or agitated. Neurologic:      EOMs intact. No facial asymmetry. No aphasia or slurred speech. Normal strength, A/O X 3. LAB DATA REVIEWED:    Recent Results (from the past 24 hour(s))   CBC WITH AUTOMATED DIFF    Collection Time: 04/25/19 12:27 PM   Result Value Ref Range    WBC 8.1 3.6 - 11.0 K/uL    RBC 4.71 3.80 - 5.20 M/uL    HGB 13.1 11.5 - 16.0 g/dL    HCT 40.1 35.0 - 47.0 %    MCV 85.1 80.0 - 99.0 FL    MCH 27.8 26.0 - 34.0 PG    MCHC 32.7 30.0 - 36.5 g/dL    RDW 13.5 11.5 - 14.5 %    PLATELET 644 791 - 856 K/uL    MPV 8.9 8.9 - 12.9 FL    NRBC 0.0 0  WBC    ABSOLUTE NRBC 0.00 0.00 - 0.01 K/uL    NEUTROPHILS 59 32 - 75 %    LYMPHOCYTES 35 12 - 49 %    MONOCYTES 5 5 - 13 %    EOSINOPHILS 1 0 - 7 %    BASOPHILS 0 0 - 1 %    IMMATURE GRANULOCYTES 0 0.0 - 0.5 %    ABS. NEUTROPHILS 4.7 1.8 - 8.0 K/UL    ABS. LYMPHOCYTES 2.9 0.8 - 3.5 K/UL    ABS. MONOCYTES 0.4 0.0 - 1.0 K/UL    ABS. EOSINOPHILS 0.1 0.0 - 0.4 K/UL    ABS. BASOPHILS 0.0 0.0 - 0.1 K/UL    ABS. IMM. GRANS. 0.0 0.00 - 0.04 K/UL    DF AUTOMATED     METABOLIC PANEL, COMPREHENSIVE    Collection Time: 04/25/19 12:27 PM   Result Value Ref Range    Sodium 134 (L) 136 - 145 mmol/L    Potassium 4.4 3.5 - 5.1 mmol/L    Chloride 103 97 - 108 mmol/L    CO2 25 21 - 32 mmol/L    Anion gap 6 5 - 15 mmol/L    Glucose 168 (H) 65 - 100 mg/dL    BUN 10 6 - 20 MG/DL    Creatinine 0.71 0.55 - 1.02 MG/DL    BUN/Creatinine ratio 14 12 - 20      GFR est AA >60 >60 ml/min/1.73m2    GFR est non-AA >60 >60 ml/min/1.73m2    Calcium 9.1 8.5 - 10.1 MG/DL    Bilirubin, total 0.3 0.2 - 1.0 MG/DL    ALT (SGPT) 58 12 - 78 U/L    AST (SGOT) 37 15 - 37 U/L    Alk.  phosphatase 120 (H) 45 - 117 U/L Protein, total 8.5 (H) 6.4 - 8.2 g/dL    Albumin 4.0 3.5 - 5.0 g/dL    Globulin 4.5 (H) 2.0 - 4.0 g/dL    A-G Ratio 0.9 (L) 1.1 - 2.2     LIPASE    Collection Time: 04/25/19 12:27 PM   Result Value Ref Range    Lipase 59 (L) 73 - 393 U/L   MAGNESIUM    Collection Time: 04/25/19 12:27 PM   Result Value Ref Range    Magnesium 1.8 1.6 - 2.4 mg/dL       IMAGING RESULTS:  I have personally reviewed the imaging reports    DATE: 4/19/19  EXAM:  CT ABDOMEN PELVIS WITH CONTRAST  INDICATION:  abd pain. Additional history: Midepigastric pain x4 days  COMPARISON: None. .  TECHNIQUE:   Multislice helical CT was performed from the diaphragm to the symphysis pubis  with oral and intravenous contrast administration. Contiguous 5 mm axial images  were reconstructed and lung and soft tissue windows were generated. Coronal and  sagittal reformations were generated. CT dose reduction was achieved through use of a standardized protocol tailored  for this examination and automatic exposure control for dose modulation. [place] FINDINGS:  INCIDENTALLY IMAGED CHEST:  Heart/vessels: Calcifications in the right coronary artery. Lungs/Pleura: Within normal limits. .  ABDOMEN:  Liver: Within normal limits. Gallbladder/Biliary: Status post cholecystectomy. Spleen: Within normal limits. Pancreas: Within normal limits. Adrenals: Within normal limits. Kidneys: Within normal limits. Peritoneum/Mesenteries: Within normal limits. There are numerous surgical clips  anteriorly in the right lower quadrant  Extraperitoneum: Minimal stranding about the head of the pancreas. Gastrointestinal tract: Within normal limits. Truncated appendix with a surgical  clip immediately adjacent. Vascular: Calcifications in the aorta. [place] PELVIS:  Extraperitoneum: Calculi in the right side of the pelvis suggesting phleboliths. Ureters: Within normal limits. Bladder: Within normal limits. Reproductive System: Status post hysterectomy.   .  MSK:   Degenerative changes in the lumbar spine with vacuum disc phenomenon at multiple  levels. Small, fat-containing umbilical hernia  . IMPRESSION  IMPRESSION:   1. Minimal stranding about the head of the pancreas. Recommend clinical  correlation for pancreatitis. 2. Incidental findings as above     --------------------------    DATE: 4/21/19  CT ABDOMEN AND PELVIS WITH CONTRAST. 4/21/2019 8:14 AM      INDICATION: Abdominal pain, fever. Acute epigastric abdominal pain and back  pain.     COMPARISON: None.     TECHNIQUE: CT of the abdomen and pelvis was performed after the administration  100 cc IV [id number]. CT dose reduction was achieved through use of a  standardized protocol tailored for this examination and automatic exposure  control for dose modulation.     FINDINGS:  Abdomen: Post cholecystectomy. The lung bases are clear. The heart size is  normal. Right coronary artery calcifications. The distal esophagus, stomach,  duodenum, liver, pancreas, spleen, adrenals, and kidneys are normal.     Pelvis: Post appendectomy and hysterectomy. The small bowel, ileocecal junction,  colon, and bladder are normal. No free air or fluid, and no abdominopelvic  lymphadenopathy.     IMPRESSION  IMPRESSION: No acute process in the abdomen and pelvis.       Total time spent with patient: 50 minutes ________________________________________________________________________  Care Plan discussed with:  Patient y   Family  y- spouse   RN               Consultant:  syed Oliva  4/25/2019:  ________________________________________________________________________    ___________________________________________________  Consulting Provider:  Ciera Palmer NP      4/25/2019  4:13 PM

## 2019-04-25 NOTE — ED PROVIDER NOTES
EMERGENCY DEPARTMENT HISTORY AND PHYSICAL EXAM 
     
 
Date: 4/25/2019 Patient Name: Armando Leach History of Presenting Illness Chief Complaint Patient presents with  Abdominal Pain Dx with pancreatitis on last week. Pt reports N  denies vomiting. Pt has been seen here x3 in the past week for same. Pt was seen by Dr Zonia Clay this morning and referred to ER for fluids and pain management. History Provided By: Patient HPI: Armando Leahc is a 59 y.o. female, pmhx diabetes, GERD, MS, depression and anxiety, who presents ambulatory from her GI physician's office to the ED c/o continued pain and nausea after hospitalization for pancreatitis. It was recommended by Dr. Lainey Shah PA the patient return to the emergency room given her continued symptoms and p.o. intolerance. The patient states she was here on good Friday was discharged home came back on Sunday for continued symptoms and discharged home. She continues to complain of epigastric burning with radiation achy pain down to the right lower quadrant. She is taking antiemetics as well as Percocet for her pain but her symptoms have been uncontrolled. Patient specifically denies any recent fevers, chills, nausea, vomiting, diarrhea, abd pain, CP, SOB, urinary sxs, changes in BM, or headache. PCP: Savita Kapadia MD 
 
Allergies: Penicillin Social Hx: Daily tobacco, denies EtOH, denies illicit Drugs There are no other complaints, changes, or physical findings at this time. Current Facility-Administered Medications Medication Dose Route Frequency Provider Last Rate Last Dose  morphine injection 2 mg  2 mg IntraVENous Q4H PRN Christiano Grant MD   2 mg at 04/27/19 2149  pantoprazole (PROTONIX) 40 mg in sodium chloride 0.9% 10 mL injection  40 mg IntraVENous Q12H Vietnamese Cola, NP   40 mg at 04/27/19 2143  lactated Ringers infusion  125 mL/hr IntraVENous CONTINUOUS Karin Kike Tesfaye,  mL/hr at 04/27/19 1853 125 mL/hr at 04/27/19 1853  insulin lispro (HUMALOG) injection   SubCUTAneous Q6H Rl Uriarte MD   2 Units at 04/27/19 1753  glucose chewable tablet 16 g  4 Tab Oral PRN Rl Uriarte MD   4 Tab at 04/26/19 1906  dextrose (D50W) injection syrg 12.5-25 g  12.5-25 g IntraVENous PRN Rl Uriarte MD   12.5 g at 04/26/19 1939  
 glucagon (GLUCAGEN) injection 1 mg  1 mg IntraMUSCular PRN Rl Uriarte MD      
 [facility] 4 mg  4 mg IntraVENous Q6H PRN Rl Uriarte MD      
 sodium chloride (NS) flush 5-40 mL  5-40 mL IntraVENous Q8H Rl Uriarte MD   10 mL at 04/27/19 2143  sodium chloride (NS) flush 5-40 mL  5-40 mL IntraVENous PRN Rl Uriarte MD      
 acetaminophen (TYLENOL) tablet 650 mg  650 mg Oral Q6H PRN Rl Uriarte MD      
 gabapentin (NEURONTIN) capsule 600 mg  600 mg Oral BID Rl Uriarte MD   600 mg at 04/27/19 1752  FLUoxetine (PROzac) capsule 60 mg  60 mg Oral Per Protocol Rl Uriarte MD      
 clonazePAM (KlonoPIN) tablet 4 mg  4 mg Oral QHS Rl Uriarte MD   4 mg at 04/27/19 2143  atorvastatin (LIPITOR) tablet 40 mg  40 mg Oral DAILY Rl Uriarte MD   40 mg at 04/27/19 0900  
 nicotine (NICODERM CQ) 7 mg/24 hr patch 1 Patch  1 Patch TransDERmal DAILY Rl Uriarte MD   1 Patch at 04/27/19 4772  prochlorperazine (COMPAZINE) with saline injection 5 mg  5 mg IntraVENous Q6H PRN Rl Uriarte MD   5 mg at 04/27/19 2143 Past History Past Medical History: 
Past Medical History:  
Diagnosis Date  [facility] ([email or web address] SKIN-LABIA  Chronic pain  Depression with anxiety  Diabetes ([email or web address]  GERD (gastroesophageal reflux disease)  Labial lesion   
 precancerous, resected  Multiple sclerosis ([email or web address]  Psychiatric disorder DEPRESSION/ ANXIETY  Tobacco abuse Past Surgical History: 
Past Surgical History:  
Procedure Laterality Date  HX APPENDECTOMY  HX  SECTION    
 HX CHOLECYSTECTOMY  HX HERNIA REPAIR    
 HX HYSTERECTOMY Family History: 
Family History Problem Relation Age of Onset  Heart Disease Father  Diabetes Mother  Parkinsonism Mother  COPD Mother  Breast Cancer Paternal Grandmother   
     over 48  Anesth Problems Neg Hx Social History: 
Social History Tobacco Use  Smoking status: Current Every Day Smoker Packs/day: 0.50 Years: 35.00 Pack years: 17.50 Types: Cigarettes  Smokeless tobacco: Never Used Substance Use Topics  Alcohol use: No  
 Drug use: No  
 
 
Allergies: Allergies Allergen Reactions  Pcn [Penicillins] Rash Review of Systems Review of Systems Constitutional: Negative for activity change, appetite change, chills, fever and unexpected weight change. HENT: Negative for congestion. Eyes: Negative for pain and visual disturbance. Respiratory: Negative for cough and shortness of breath. Cardiovascular: Negative for chest pain. Gastrointestinal: Positive for abdominal pain, diarrhea (Dark black) and vomiting. Negative for nausea. Genitourinary: Negative for dysuria. Musculoskeletal: Negative for back pain. Skin: Negative for rash. Neurological: Negative for headaches. Physical Exam  
Physical Exam  
Constitutional: She is oriented to person, place, and time. She appears well-developed and well-nourished. This is a well-appearing middle-aged female in mild to moderate distress due to her symptoms. HENT:  
Head: Normocephalic and atraumatic. Mildly dry mucous membranes Eyes: Pupils are equal, round, and reactive to light. Conjunctivae and EOM are normal. Right eye exhibits no discharge. Left eye exhibits no discharge. Neck: Normal range of motion. Neck supple. Cardiovascular: Normal rate, regular rhythm and normal heart sounds. No murmur heard. Pulmonary/Chest: Effort normal and breath sounds normal. No respiratory distress. She has no wheezes. She has no rales. Abdominal: Soft. Bowel sounds are normal. She exhibits no distension and no mass. There is no tenderness. There is no rebound and no guarding. Abdominal exam benign without concern for peritonitis Genitourinary:  
Genitourinary Comments: Rectal exam without gross blood present;  
Musculoskeletal: Normal range of motion. She exhibits no edema. Neurological: She is alert and oriented to person, place, and time. No cranial nerve deficit. She exhibits normal muscle tone. Skin: Skin is warm and dry. No rash noted. She is not diaphoretic. Nursing note and vitals reviewed. Diagnostic Study Results Labs - Recent Results (from the past 12 hour(s)) GLUCOSE, POC Collection Time: 04/27/19  5:50 PM  
Result Value Ref Range Glucose (POC) 187 (H) 65 - 100 mg/dL Performed by Aziza Garcia GLUCOSE, POC Collection Time: 04/27/19 11:29 PM  
Result Value Ref Range Glucose (POC) 103 (H) 65 - 100 mg/dL Performed by Emmie Partida METABOLIC PANEL, BASIC Collection Time: 04/28/19  3:10 AM  
Result Value Ref Range Sodium 139 136 - 145 mmol/L Potassium 4.0 3.5 - 5.1 mmol/L Chloride 109 (H) 97 - 108 mmol/L  
 CO2 26 21 - 32 mmol/L Anion gap 4 (L) 5 - 15 mmol/L Glucose 118 (H) 65 - 100 mg/dL BUN 5 (L) 6 - 20 MG/DL Creatinine 0.59 0.55 - 1.02 MG/DL  
 BUN/Creatinine ratio 8 (L) 12 - 20 GFR est AA >60 >60 ml/min/1.73m2 GFR est non-AA >60 >60 ml/min/1.73m2 Calcium 8.8 8.5 - 10.1 MG/DL MAGNESIUM Collection Time: 04/28/19  3:10 AM  
Result Value Ref Range Magnesium 1.8 1.6 - 2.4 mg/dL PHOSPHORUS Collection Time: 04/28/19  3:10 AM  
Result Value Ref Range Phosphorus 4.1 2.6 - 4.7 MG/DL  
HEPATIC FUNCTION PANEL Collection Time: 04/28/19  3:10 AM  
Result Value Ref Range Protein, total 6.9 6.4 - 8.2 g/dL Albumin 3.3 (L) 3.5 - 5.0 g/dL Globulin 3.6 2.0 - 4.0 g/dL A-G Ratio 0.9 (L) 1.1 - 2.2 Bilirubin, total 0.4 0.2 - 1.0 MG/DL Bilirubin, direct 0.1 0.0 - 0.2 MG/DL Alk. phosphatase 93 45 - 117 U/L  
 AST (SGOT) 34 15 - 37 U/L  
 ALT (SGPT) 40 12 - 78 U/L  
LIPASE Collection Time: 04/28/19  3:10 AM  
Result Value Ref Range Lipase 41 (L) 73 - 393 U/L  
AMYLASE Collection Time: 04/28/19  3:10 AM  
Result Value Ref Range Amylase 19 (L) 25 - 115 U/L Radiologic Studies - No orders to display CT Results  (Last 48 hours) None CXR Results  (Last 48 hours) None Medical Decision Making I am the first provider for this patient. I reviewed the vital signs, available nursing notes, past medical history, past surgical history, family history and social history. Vital Signs-Reviewed the patient's vital signs. Patient Vitals for the past 12 hrs: 
 Temp Pulse Resp BP SpO2  
04/28/19 0313 98.4 °F (36.9 °C) 77 16 145/82 96 % 04/27/19 2325 97.9 °F (36.6 °C) 77 16 147/80 100 % 04/27/19 1854 98.7 °F (37.1 °C) 78 16 141/76 98 % 04/27/19 1625 98.6 °F (37 °C) 72 16 142/78 98 % Pulse Oximetry Analysis - 100% on RA Cardiac Monitor:  
Rate: 80bpm 
Rhythm: Normal Sinus Rhythm Records Reviewed: Nursing Notes, Old Medical Records, Previous Radiology Studies and Previous Laboratory Studies Provider Notes (Medical Decision Making): MDM: Middle-aged female with current diagnosis of pancreatitis complaining of continued nausea epigastric burning and diarrhea. Of note she complains that her diarrhea is black but denies any bright red blood. She states her pain is not changed in the past week. Her abdominal exam is benign without concern for left upper quadrant tenderness; however she does have some epigastric tenderness but without blood from her rectum. Will initiate symptom management with close observation. ED Course: Initial assessment performed. The patients presenting problems have been discussed, and they are in agreement with the care plan formulated and outlined with them. I have encouraged them to ask questions as they arise throughout their visit. PROGRESS NOTE: 
115pm results have been reviewed without concern for acute pancreatitis or hepatitis; will initiate p.o. Challenge. 2:45 PM 
Patient reevaluated and has not been vomiting but states she still feels miserable has extreme nausea and her pain has not been controlled at all will initiate further medications and discuss with internal medicine for admission and discussed with GI for consultation. CONSULT NOTE:  
3:20 PM 
Katelyn Castaneda MD  spoke with Dr. Britney Lawrence, Specialty: Internal medicine, hospitalist 
Discussed pt's hx, disposition, and available diagnostic and imaging results. Reviewed care plans. Consultant agrees with plans as outlined. She will evaluate the patient for admission. CONSULT NOTE:  
3:45 PM 
Katelyn Castaneda MD spoke with the PA from the gastroenterologist office, Discussed pt's hx, disposition, and available diagnostic and imaging results. Reviewed care plans. Consultant agrees with plans as outlined. They will see the patient while she is admitted to the hospital. 
 
 
Critical Care Time:  
0 Diagnosis Clinical Impression: 1. Abdominal pain, epigastric PLAN: 
1. Admission to internal medicine with GI consultation Please note, this dictation was completed with [employer], the computer voice recognition software. Quite often unanticipated grammatical, syntax, homophones, and other interpretive errors are inadvertently transcribed by the computer software. Please disregard these errors. Please excuse any errors that have escaped final proof reading.

## 2019-04-25 NOTE — PROGRESS NOTES
Pharmacy Clarification of Prior to Admission Medication Regimen The patient was interviewed regarding clarification of the prior to admission medication regimen and was questioned regarding use of any other inhalers, topical products, over the counter medications, herbal medications, vitamin products or ophthalmic/nasal/otic medication use. Information Obtained From: RX Query, Patient Pertinent Pharmacy Findings: 
Updated patient?s preferred outpatient pharmacy to: [facility] [address] [place] [address], [address] [place] [address] Identified High Alert Medication Information Concentrated Insulin Products: 
Name: insulin glargine U-300 conc (TOUJEO SOLOSTAR U-300 INSULIN) 300 unit/mL (1.5 mL) inpn Dosing Instructions: 32 Units by SubCUTAneous route daily. PTA medication list was corrected to the following:  
 
Prior to Admission Medications Prescriptions Last Dose Informant Patient Reported? Taking? FLUoxetine (PROZAC) 20 mg tablet 2019 at Unknown time Self Yes Yes Sig: Take 60 mg by mouth daily. atorvastatin (LIPITOR) 40 mg tablet 2019 at Unknown time Self Yes Yes Sig: Take 40 mg by mouth daily. clonazepam (KLONOPIN) 2 mg tablet 2019 at Unknown time Self Yes Yes Sig: Take 4 mg by mouth nightly.  
gabapentin (NEURONTIN) 400 mg capsule 2019 at Unknown time Self Yes Yes Sig: Take 600 mg by mouth two (2) times a day. insulin glargine U-300 conc (TOUJEO SOLOSTAR U-300 INSULIN) 300 unit/mL (1.5 mL) inpn 2019 at Unknown time Self Yes Yes Si Units by SubCUTAneous route daily. lisinopril (PRINIVIL, ZESTRIL) 5 mg tablet 2019 at Unknown time Self Yes Yes Sig: Take 5 mg by mouth daily. metFORMIN (GLUCOPHAGE) 500 mg tablet 2019 at Unknown time Self Yes Yes Sig: Take 500 mg by mouth two (2) times daily (with meals). oxyCODONE IR (ROXICODONE) 10 mg tab immediate release tablet 2019 at Unknown time Self Yes Yes Sig: Take 10 mg by mouth every six (6) hours as needed for Pain. oxyCODONE-acetaminophen (PERCOCET) 5-325 mg per tablet 4/25/2019 at Unknown time Self No Yes Sig: Take 1 Tab by mouth every six (6) hours as needed for Pain for up to 7 days. Max Daily Amount: 4 Tabs. Facility-Administered Medications: None Thank you, 
Petty Sal Medication History Pharmacy Technician

## 2019-04-25 NOTE — ED NOTES
Pt given water at this time for PO challenge. Pt tolerated well, will call if she feels more nauseous or vomits.

## 2019-04-25 NOTE — ED NOTES
En route from Dr Purvi Malin office (GI). Recent dx of pancreatitis; not tolerating PO with AP at home.

## 2019-04-25 NOTE — ED NOTES
Pt referred to ED by Dr. Antoine Sherwood MD, for uncontrolled mid upper abdominal pain and N since dx pancreatitis 4/19. Pt was seen here on 4/19 and required admission however pt refused to stay and be treated outpatient. Pt states pain is 9/10 and has nausea and D x6 days Pt denies fever, chills, vomit. Pt placed x3 on monitor, daughter at bedside

## 2019-04-25 NOTE — H&P
Hospitalist Admission NoteNAME: Swapna Munson :  1955 MRN:  [id number] Date/Time:  2019 6:43 PM 
 
Patient PCP: Fran Hanna MD  
Endocrinologist:  Dr. Israel Rodas 
______________________________________________________________________ Assessment & Plan: 
Epigastric pain, POA Diarrhea with \"dark\" stool Nausea 
--suspect smoldering pancreatitis (had stranding at pancreatic head on CT ) vs. Gastroenteritis or PUD. --repeat abdominal CT  was normal.  Lipase has been normal.  Hgb been normal/stable 
--observation status, IVF with LR, pain control with prn morphine, prn zofran. NPO except ice chips and meds 
--GI consult appreciated and started IV protonix bid. Hemoccult stool ordered --check stool culture, fecal leukocyte, C. Diff, O&P. 
--considering EGD if no improvement DM type 2 
--hold metformin and tujeo while npo. Moderate dose SSI. HTN 
--hold lisinopril. Pancreatitis has been associated with ACE-I Hyperlipidemia 
--continue lipitor. Check lipid panel Depression 
--continue prozac Multiple sclerosis --continue neurontin Tobacco use 
--advised smoke cessation. Nicotine patch Body mass index is 26.47 kg/m². Code:  full DVT prophylaxis: SCD Surrogate decision maker:   Subjective: CHIEF COMPLAINT:  Abdominal pain, diarrhea, nausea HISTORY OF PRESENT ILLNESS:    
Swapna Munson is a 59 y.o.  female with DM type 2, hyperlipidemia, MS, depression presents with 8 days of diarrhea with dark stool about twice daily. Two days later had severe burning pain in epigastrium, sometimes radiate into RUQ feeling like someone has lit a blowtorch inside abdomen. Pain constant, resolve only with narcotic ie. Percocet, oxycodone given by ER. Pain worse with eating. Seen in ER  and CT showed minimal stranding in pancreatic head. Lipase normal at 228. Patient offered admission for pancreatitis but declined. Discharged on percocet and zofran and clear liquid diet. Returned to ER for persistent pain and nausea. Lipase was normal at 70. Repeat CT abdomen was normal and was discharged home on oxycodone. She has taken a few doses which helped with pain. Seen in GI office for follow up and was sent to ER due to pain. Lipase today normal at 59. Reports BS been running high at home. We were asked to admit for work up and evaluation of the above problems. Past Medical History:  
Diagnosis Date  [facility] ([facility] SKIN-LABIA  Chronic pain  Depression with anxiety  Diabetes ([facility]  GERD (gastroesophageal reflux disease)  Labial lesion   
 precancerous, resected  [facility]  Psychiatric disorder DEPRESSION/ ANXIETY  Tobacco abuse Past Surgical History:  
Procedure Laterality Date  HX APPENDECTOMY  HX  SECTION    
 HX CHOLECYSTECTOMY  HX HERNIA REPAIR    
 HX HYSTERECTOMY Social History Tobacco Use  Smoking status: Current Every Day Smoker Packs/day: 0.50 Years: 35.00 Pack years: 17.50 Types: Cigarettes  Smokeless tobacco: Never Used Substance Use Topics  Alcohol use: No  
  Family History Problem Relation Age of Onset  Heart Disease Father  Diabetes Mother  Parkinsonism Mother  COPD Mother  Breast Cancer Paternal Grandmother   
     over 48  Anesth Problems Neg Hx Allergies Allergen Reactions  Pcn [Penicillins] Rash Prior to Admission medications Medication Sig Start Date End Date Taking? Authorizing Provider  
atorvastatin (LIPITOR) 40 mg tablet Take 40 mg by mouth daily. Yes Provider, Historical  
insulin glargine U-300 conc (TOUJEO SOLOSTAR U-300 INSULIN) 300 unit/mL (1.5 mL) inpn 32 Units by SubCUTAneous route daily.    Yes Provider, Historical  
 lisinopril (PRINIVIL, ZESTRIL) 5 mg tablet Take 5 mg by mouth daily. Yes Provider, Historical  
oxyCODONE IR (ROXICODONE) 10 mg tab immediate release tablet Take 10 mg by mouth every six (6) hours as needed for Pain. Yes Provider, Historical  
oxyCODONE-acetaminophen (PERCOCET) 5-325 mg per tablet Take 1 Tab by mouth every six (6) hours as needed for Pain for up to 7 days. Max Daily Amount: 4 Tabs. 4/19/19 4/26/19 Yes Karyle China, NP  
FLUoxetine (PROZAC) 20 mg tablet Take 60 mg by mouth daily. Yes Provider, Historical  
metFORMIN (GLUCOPHAGE) 500 mg tablet Take 500 mg by mouth two (2) times daily (with meals). Yes Provider, Historical  
gabapentin (NEURONTIN) 400 mg capsule Take 600 mg by mouth two (2) times a day. Yes Provider, Historical  
clonazepam (KLONOPIN) 2 mg tablet Take 4 mg by mouth nightly. Yes Provider, Historical  
 
REVIEW OF SYSTEMS:  POSITIVE= Bold. Negative = normal text General:  fever, chills, sweats, generalized weakness, weight loss/gain, loss of appetite Eyes:  blurred vision, eye pain, loss of vision, diplopia Ear Nose and Throat:  rhinorrhea, pharyngitis Respiratory:   cough, sputum production, SOB, wheezing, FRYE, pleuritic pain 
Cardiology:  chest pain, palpitations, orthopnea, PND, edema, syncope Gastrointestinal:  abdominal pain, N/V, dysphagia, diarrhea, constipation, bleeding Genitourinary:  frequency, urgency, dysuria, hematuria, incontinence Muskuloskeletal :  arthralgia, myalgia Hematology:  easy bruising, bleeding, lymphadenopathy Dermatological:  rash, ulceration, pruritis Endocrine:  hot flashes or polydipsia Neurological:  headache, dizziness, confusion, focal weakness, paresthesia, memory loss, gait disturbance Psychological: anxiety, depression, agitation Objective: VITALS:   
Visit Vitals /70 Pulse (!) 59 Temp 97.8 °F (36.6 °C) Resp 17 Ht 5' 9\" (1.753 m) Wt 81.3 kg (179 lb 3.7 oz) SpO2 97% BMI 26.47 kg/m² Temp (24hrs), Av.4 °F (36.9 °C), Min:97.8 °F (36.6 °C), Max:99 °F (37.2 °C) Body mass index is 26.47 kg/m². PHYSICAL EXAM: 
 
General:    Alert, cooperative, no distress, appears older than stated age. HEENT: Atraumatic, anicteric sclerae, pink conjunctivae No oral ulcers, mucosa moist, throat clear. Hearing intact. Neck:  Supple, symmetrical,  thyroid: non tender Lungs:   Clear to auscultation bilaterally. No Wheezing or Rhonchi. No rales. Chest wall:  No tenderness  No Accessory muscle use. Heart:   Regular  rhythm,  No  murmur   No gallop. No edema. Abdomen:   Soft, moderate tenderness in epigastrium, mild tenderness in RLQ, periumbilical. Not distended. Bowel sounds normal. No masses Extremities: No cyanosis. No clubbing Skin:     Not pale Not Jaundiced  No rashes Psych:  Good insight. Not depressed. Not anxious or agitated. Neurologic: EOMs intact. No facial asymmetry. No aphasia or slurred speech. Symmetrical strength, Alert and oriented X 3. IMAGING RESULTS: 
 []       I have personally reviewed the actual   []     CXR  []     CT scan CXR: 
CT : 
EKG: 
 ________________________________________________________________________ Care Plan discussed with: 
  Comments Patient y Family RN Care Manager Consultant:     
________________________________________________________________________ Prophylaxis: 
GI PPI  
DVT SCD  
________________________________________________________________________ Recommended Disposition:  
Home with Family y HH/PT/OT/RN   
SNF/LTC   
RIKA   
________________________________________________________________________ Code Status: 
Full Code y  
DNR/DNI   
________________________________________________________________________ TOTAL TIME:  60 minutes Comments  
 y Reviewed previous records  
 
 
______________________________________________________________________ Herchel Fairy, MD 
 
 
 Procedures: see electronic medical records for all procedures/Xrays and details which were not copied into this note but were reviewed prior to creation of Plan. LAB DATA REVIEWED:   
Recent Results (from the past 24 hour(s)) CBC WITH AUTOMATED DIFF Collection Time: 04/25/19 12:27 PM  
Result Value Ref Range WBC 8.1 3.6 - 11.0 K/uL  
 RBC 4.71 3.80 - 5.20 M/uL  
 HGB 13.1 11.5 - 16.0 g/dL HCT 40.1 35.0 - 47.0 % MCV 85.1 80.0 - 99.0 FL  
 MCH 27.8 26.0 - 34.0 PG  
 MCHC 32.7 30.0 - 36.5 g/dL  
 RDW 13.5 11.5 - 14.5 % PLATELET 851 386 - 438 K/uL MPV 8.9 8.9 - 12.9 FL  
 NRBC 0.0 0  WBC ABSOLUTE NRBC 0.00 0.00 - 0.01 K/uL NEUTROPHILS 59 32 - 75 % LYMPHOCYTES 35 12 - 49 % MONOCYTES 5 5 - 13 % EOSINOPHILS 1 0 - 7 % BASOPHILS 0 0 - 1 % IMMATURE GRANULOCYTES 0 0.0 - 0.5 % ABS. NEUTROPHILS 4.7 1.8 - 8.0 K/UL  
 ABS. LYMPHOCYTES 2.9 0.8 - 3.5 K/UL  
 ABS. MONOCYTES 0.4 0.0 - 1.0 K/UL  
 ABS. EOSINOPHILS 0.1 0.0 - 0.4 K/UL  
 ABS. BASOPHILS 0.0 0.0 - 0.1 K/UL  
 ABS. IMM. GRANS. 0.0 0.00 - 0.04 K/UL  
 DF AUTOMATED METABOLIC PANEL, COMPREHENSIVE Collection Time: 04/25/19 12:27 PM  
Result Value Ref Range Sodium 134 (L) 136 - 145 mmol/L Potassium 4.4 3.5 - 5.1 mmol/L Chloride 103 97 - 108 mmol/L  
 CO2 25 21 - 32 mmol/L Anion gap 6 5 - 15 mmol/L Glucose 168 (H) 65 - 100 mg/dL BUN 10 6 - 20 MG/DL Creatinine 0.71 0.55 - 1.02 MG/DL  
 BUN/Creatinine ratio 14 12 - 20 GFR est AA >60 >60 ml/min/1.73m2 GFR est non-AA >60 >60 ml/min/1.73m2 Calcium 9.1 8.5 - 10.1 MG/DL Bilirubin, total 0.3 0.2 - 1.0 MG/DL  
 ALT (SGPT) 58 12 - 78 U/L  
 AST (SGOT) 37 15 - 37 U/L Alk. phosphatase 120 (H) 45 - 117 U/L Protein, total 8.5 (H) 6.4 - 8.2 g/dL Albumin 4.0 3.5 - 5.0 g/dL Globulin 4.5 (H) 2.0 - 4.0 g/dL A-G Ratio 0.9 (L) 1.1 - 2.2 LIPASE Collection Time: 04/25/19 12:27 PM  
Result Value Ref Range  Lipase 59 (L) 73 - 393 U/L  
 MAGNESIUM Collection Time: 04/25/19 12:27 PM  
Result Value Ref Range Magnesium 1.8 1.6 - 2.4 mg/dL GLUCOSE, POC Collection Time: 04/25/19  6:15 PM  
Result Value Ref Range Glucose (POC) 90 65 - 100 mg/dL Performed by [facility] Blood (PCT)

## 2019-04-25 NOTE — PROGRESS NOTES
Oncology End of Shift Note Bedside shift change report given to JIMBO Pak (incoming nurse) by Maryam Beavers (outgoing [facility] Report included the following information SBAR, Kardex and MAR. Shift Summary:  
Patient transferred from ED (Report received from Sydnee). Admission documentation completed, Admission assessment completed, tele strip completed. No Insulin coverage needed. MD present to assess patient upon admission. Dual skin completed. Issues for Physician to Address:   
 
Patient on Cardiac Monitoring? [x] Yes 
[] No 
 
Rhythm: Telemetry: normal sinus rhythm Shift Events Maryam Beavers

## 2019-04-26 LAB
ALBUMIN SERPL-MCNC: 3.1 G/DL (ref 3.5–5)
ALBUMIN/GLOB SERPL: 0.9 {RATIO} (ref 1.1–2.2)
ALP SERPL-CCNC: 91 U/L (ref 45–117)
ALT SERPL-CCNC: 43 U/L (ref 12–78)
AMYLASE SERPL-CCNC: 25 U/L (ref 25–115)
ANION GAP SERPL CALC-SCNC: 3 MMOL/L (ref 5–15)
AST SERPL-CCNC: 32 U/L (ref 15–37)
BILIRUB SERPL-MCNC: 0.2 MG/DL (ref 0.2–1)
BUN SERPL-MCNC: 7 MG/DL (ref 6–20)
BUN/CREAT SERPL: 10 (ref 12–20)
CALCIUM SERPL-MCNC: 8.4 MG/DL (ref 8.5–10.1)
CHLORIDE SERPL-SCNC: 110 MMOL/L (ref 97–108)
CHOLEST SERPL-MCNC: 106 MG/DL
CO2 SERPL-SCNC: 28 MMOL/L (ref 21–32)
CREAT SERPL-MCNC: 0.68 MG/DL (ref 0.55–1.02)
ERYTHROCYTE [DISTWIDTH] IN BLOOD BY AUTOMATED COUNT: 13.5 % (ref 11.5–14.5)
GLOBULIN SER CALC-MCNC: 3.4 G/DL (ref 2–4)
GLUCOSE BLD STRIP.AUTO-MCNC: 162 MG/DL (ref 65–100)
GLUCOSE BLD STRIP.AUTO-MCNC: 190 MG/DL (ref 65–100)
GLUCOSE BLD STRIP.AUTO-MCNC: 203 MG/DL (ref 65–100)
GLUCOSE BLD STRIP.AUTO-MCNC: 74 MG/DL (ref 65–100)
GLUCOSE BLD STRIP.AUTO-MCNC: 75 MG/DL (ref 65–100)
GLUCOSE BLD STRIP.AUTO-MCNC: 83 MG/DL (ref 65–100)
GLUCOSE BLD STRIP.AUTO-MCNC: 93 MG/DL (ref 65–100)
GLUCOSE SERPL-MCNC: 97 MG/DL (ref 65–100)
HCT VFR BLD AUTO: 34.7 % (ref 35–47)
HDLC SERPL-MCNC: 35 MG/DL
HDLC SERPL: 3 {RATIO} (ref 0–5)
HGB BLD-MCNC: 11.1 G/DL (ref 11.5–16)
LDLC SERPL CALC-MCNC: 46.8 MG/DL (ref 0–100)
LIPASE SERPL-CCNC: 57 U/L (ref 73–393)
LIPID PROFILE,FLP: NORMAL
MCH RBC QN AUTO: 27.8 PG (ref 26–34)
MCHC RBC AUTO-ENTMCNC: 32 G/DL (ref 30–36.5)
MCV RBC AUTO: 86.8 FL (ref 80–99)
NRBC # BLD: 0 K/UL (ref 0–0.01)
NRBC BLD-RTO: 0 PER 100 WBC
PLATELET # BLD AUTO: 283 K/UL (ref 150–400)
PMV BLD AUTO: 8.8 FL (ref 8.9–12.9)
POTASSIUM SERPL-SCNC: 3.7 MMOL/L (ref 3.5–5.1)
PROT SERPL-MCNC: 6.5 G/DL (ref 6.4–8.2)
RBC # BLD AUTO: 4 M/UL (ref 3.8–5.2)
SERVICE CMNT-IMP: ABNORMAL
SERVICE CMNT-IMP: NORMAL
SODIUM SERPL-SCNC: 141 MMOL/L (ref 136–145)
TRIGL SERPL-MCNC: 121 MG/DL (ref ?–150)
VLDLC SERPL CALC-MCNC: 24.2 MG/DL
WBC # BLD AUTO: 5.4 K/UL (ref 3.6–11)

## 2019-04-26 PROCEDURE — 85027 COMPLETE CBC AUTOMATED: CPT

## 2019-04-26 PROCEDURE — 74011250636 HC RX REV CODE- 250/636

## 2019-04-26 PROCEDURE — 36415 COLL VENOUS BLD VENIPUNCTURE: CPT

## 2019-04-26 PROCEDURE — 74011000250 HC RX REV CODE- 250: Performed by: NURSE PRACTITIONER

## 2019-04-26 PROCEDURE — 82150 ASSAY OF AMYLASE: CPT

## 2019-04-26 PROCEDURE — 74011250636 HC RX REV CODE- 250/636: Performed by: HOSPITALIST

## 2019-04-26 PROCEDURE — 80061 LIPID PANEL: CPT

## 2019-04-26 PROCEDURE — 74011250636 HC RX REV CODE- 250/636: Performed by: NURSE PRACTITIONER

## 2019-04-26 PROCEDURE — 99218 HC RM OBSERVATION: CPT

## 2019-04-26 PROCEDURE — 83690 ASSAY OF LIPASE: CPT

## 2019-04-26 PROCEDURE — 65660000000 HC RM CCU STEPDOWN

## 2019-04-26 PROCEDURE — 74011000250 HC RX REV CODE- 250: Performed by: HOSPITALIST

## 2019-04-26 PROCEDURE — 94760 N-INVAS EAR/PLS OXIMETRY 1: CPT

## 2019-04-26 PROCEDURE — 80053 COMPREHEN METABOLIC PANEL: CPT

## 2019-04-26 PROCEDURE — 82962 GLUCOSE BLOOD TEST: CPT

## 2019-04-26 PROCEDURE — 74011250637 HC RX REV CODE- 250/637: Performed by: HOSPITALIST

## 2019-04-26 PROCEDURE — C9113 INJ PANTOPRAZOLE SODIUM, VIA: HCPCS | Performed by: NURSE PRACTITIONER

## 2019-04-26 RX ORDER — MORPHINE SULFATE 2 MG/ML
INJECTION, SOLUTION INTRAMUSCULAR; INTRAVENOUS
Status: COMPLETED
Start: 2019-04-26 | End: 2019-04-26

## 2019-04-26 RX ORDER — MORPHINE SULFATE 2 MG/ML
2 INJECTION, SOLUTION INTRAMUSCULAR; INTRAVENOUS
Status: DISCONTINUED | OUTPATIENT
Start: 2019-04-26 | End: 2019-04-28

## 2019-04-26 RX ADMIN — MORPHINE SULFATE 2 MG: 2 INJECTION, SOLUTION INTRAMUSCULAR; INTRAVENOUS at 20:34

## 2019-04-26 RX ADMIN — MORPHINE SULFATE 2 MG: 2 INJECTION, SOLUTION INTRAMUSCULAR; INTRAVENOUS at 05:48

## 2019-04-26 RX ADMIN — MORPHINE SULFATE 2 MG: 2 INJECTION, SOLUTION INTRAMUSCULAR; INTRAVENOUS at 10:55

## 2019-04-26 RX ADMIN — Medication 10 ML: at 05:49

## 2019-04-26 RX ADMIN — Medication 10 ML: at 14:00

## 2019-04-26 RX ADMIN — PANTOPRAZOLE SODIUM 40 MG: 40 INJECTION, POWDER, FOR SOLUTION INTRAVENOUS at 09:21

## 2019-04-26 RX ADMIN — Medication 4 TABLET: at 19:06

## 2019-04-26 RX ADMIN — GABAPENTIN 600 MG: 300 CAPSULE ORAL at 17:27

## 2019-04-26 RX ADMIN — PANTOPRAZOLE SODIUM 40 MG: 40 INJECTION, POWDER, FOR SOLUTION INTRAVENOUS at 20:33

## 2019-04-26 RX ADMIN — DEXTROSE MONOHYDRATE 12.5 G: 25 INJECTION, SOLUTION INTRAVENOUS at 19:39

## 2019-04-26 RX ADMIN — Medication 10 ML: at 22:15

## 2019-04-26 RX ADMIN — PROCHLORPERAZINE EDISYLATE 5 MG: 5 INJECTION INTRAMUSCULAR; INTRAVENOUS at 10:56

## 2019-04-26 RX ADMIN — SODIUM CHLORIDE, SODIUM LACTATE, POTASSIUM CHLORIDE, AND CALCIUM CHLORIDE 250 ML/HR: 600; 310; 30; 20 INJECTION, SOLUTION INTRAVENOUS at 00:16

## 2019-04-26 RX ADMIN — MORPHINE SULFATE 2 MG: 2 INJECTION, SOLUTION INTRAMUSCULAR; INTRAVENOUS at 15:19

## 2019-04-26 RX ADMIN — PROCHLORPERAZINE EDISYLATE 5 MG: 5 INJECTION INTRAMUSCULAR; INTRAVENOUS at 20:33

## 2019-04-26 RX ADMIN — SODIUM CHLORIDE, SODIUM LACTATE, POTASSIUM CHLORIDE, AND CALCIUM CHLORIDE 125 ML/HR: 600; 310; 30; 20 INJECTION, SOLUTION INTRAVENOUS at 05:49

## 2019-04-26 RX ADMIN — GABAPENTIN 600 MG: 300 CAPSULE ORAL at 09:21

## 2019-04-26 RX ADMIN — ATORVASTATIN CALCIUM 40 MG: 40 TABLET, FILM COATED ORAL at 09:20

## 2019-04-26 RX ADMIN — CLONAZEPAM 4 MG: 1 TABLET ORAL at 22:15

## 2019-04-26 RX ADMIN — SODIUM CHLORIDE, SODIUM LACTATE, POTASSIUM CHLORIDE, AND CALCIUM CHLORIDE 125 ML/HR: 600; 310; 30; 20 INJECTION, SOLUTION INTRAVENOUS at 15:20

## 2019-04-26 NOTE — PROGRESS NOTES
Oncology End of Shift Note Bedside shift change report given to JIMBO andino (incoming nurse) by Ellie Bedolla (outgoing nurse) on [facility]. Report included the following information SBAR, Kardex, ED Summary, Intake/Output, MAR, Recent Results and Cardiac Rhythm sinus. Shift Summary: no change in patient condition throughout shift. Patient complained of a burning pain in the mid abdominal area. PRN morphine given per orders. Patient is up ad cyndi. Labs drawn Issues for Physician to Address:  none Patient on Cardiac Monitoring? [x] Yes 
[] No 
 
Rhythm: Telemetry: normal sinus rhythm Shift Events Ellie Bedolla

## 2019-04-26 NOTE — PROGRESS NOTES
HYPOGLYCEMIC EPISODE DOCUMENTATION Patient with hypoglycemic episode(s) at 1903(time) on April 26(date). BG value(s) pre-treatment 74 Was patient symptomatic? [x] yes, [] no 
Patient was treated with the following rescue medications/treatments: [] D50 [x] Glucose tablets 
              [] Glucagon 
              [] 4oz juice 
              [] 6oz reg soda 
              [] 8oz low fat milk BG value post-treatment: 75 
D50 then given since Pt NPO.  
1943 . Will continue to Monitor.

## 2019-04-26 NOTE — PHYSICIAN ADVISORY
Letter of Status Determination:  
Recommend hospitalization status upgraded from OBSERVATION  to INPATIENT  Status Pt Name:  Talya Palacio MR#  
JENNIE # [id number] / 
[id number] Payor: Lenin Arias / Plan: [place] MEDICARE PART A / Product Type: Medicare /   
RUTH#  [id number] Room and Hospital  1141/01  @ [facility] Hospitalization date  4/25/2019 12:00 PM  
Current Attending Physician  Joyce Dobbs Principal diagnosis  Abdominal pain [R10.9] Clinicals  59 y.o. y.o  female hospitalized with above diagnosis The pt has had multiple evaluations recently for persistent abdominal pain. Pt now remains in acute care setting, receiving IVF, pain meds and PPI while further workup are in progress. We expect that her care is expected to exceed [facility] criteria Does  NOT apply STATUS DETERMINATION  This patient is at high risk of adverse events and deterioration based on documented clinical data, comorbid conditions and current acute care course. Ms. Talya Palacio is expected to meet Inpatient Admission status criteria in accordance with CMS regulation Section 43 .3. Specifically, due to medical necessity the patient's stay is expected to exceed Two Midnights. It is our recommendation that this patient's hospitalization status should be upgraded from  OBSERVATION to INPATIENT status. The final decision of the patient's hospitalization status depends on the attending physician's judgment. Additional comments Payor: Lenin Arias / Plan: [place] MEDICARE PART A / Product Type: Medicare /   
  
 
Licha Mcknight MD MPH FACP Cell: [phone] Physician Advisor [address]  
   
Cell  [phone]   
 
 
[phone] Isaac Jefferson

## 2019-04-26 NOTE — PROGRESS NOTES
GI PROGRESS NOTE Sophy Waite, [phone] NP in-hospital cell phone M-F until 4:30 After 5pm or on weekends, please call  for physician on call Yeyo Rodas :1955 SFV:[id number] ATTG: Dr. Anita Bee PCP: Lara Meyer MD 
Date/Time:  2019 9:58 AM  
 
Primary GI: Dr. Teetee Lovelace Reason for following: Acute pancreatitis, abdominal pain and nausea Assessment:  
Acute pancreatitis - Intractable abdominal pain with nausea and vomiting - onset 4/15/19 - this was 3rd presentation to ER for pain 
- Pancreatitis on initial CT scan but normal lipase of 228 on , lipase is now normalized and had normal appearing pancrease on  also - Lipase normal again today - Epigastric sharp pain is improving but is requiring pain management with morphine.  
- No hx of PUD but had gastritis in . No daily PPI. Diabetes Mellitus Anxiety Plan: · Advanced to Diabetic clear liquids today, advance tomorrow if tolerated well with no worsening symptoms · Continue IV fluids for now · Supportive care with antiemetis and analgestics · PPI BID · No benefit in serial lipase · If no better on Monday, consider completing EGD. · Will see on request this weekend, please don't hesitate to contact GI on call with any questions or concerns. Subjective:  
Discussed with RN events overnight. Doing better this AM and appears more comfortable but states pain is still present Complaint Y/N Description Abdominal Pain y  epigastric, burning in RUQ is improving Hematemesis n Hematochezia n Melena n   
Constipation n   
Diarrhea n Dyspepsia n Dysphagia n   
Jaundiced n   
Nausea/vomiting n resolved Review of Systems: 
Symptom Y/N Comments  Symptom Y/N Comments Fever/Chills n   Chest Pain Cough    Headaches n Sputum    Joint Pain SOB/FRYE    Pruritis/Rash Tolerating Diet  npo  Other Could NOT obtain due to:   
 
Objective: VITALS:  
 Last 24hrs VS reviewed since prior progress note. Most recent are: 
Visit Vitals /61 (BP 1 Location: Left arm, BP Patient Position: At rest) Pulse 60 Temp 98.7 °F (37.1 °C) Resp 16 Ht 5' 9\" (1.753 m) Wt 81.3 kg (179 lb 3.7 oz) SpO2 97% BMI 26.47 kg/m² No intake or output data in the 24 hours ending 04/26/19 0958 PHYSICAL EXAM: 
General: WD, WN. Alert, cooperative, no acute distress   
HEENT: NC, Atraumatic. Anicteric sclerae. Lungs:  CTA Bilaterally. No Wheezing/Rhonchi/Rales. Heart:  Regular  rhythm,  No murmur (-), No Rubs, No Gallops Abdomen: Soft, Non distended, tender to epigastric region only.  +Bowel sounds, no HSM Extremities: No c/c/e Neurologic:  Alert and oriented X 3. No acute neurological distress Psych:   Good insight. Not anxious nor agitated. Lab and Radiology Data Reviewed: (see below) Medications Reviewed: (see below) PMH/SH reviewed - no change compared to H&P 
________________________________________________________________________ Total time spent with patient: 20 minutes ________________________________________________________________________ Care Plan discussed with: 
Patient y Family RN y  
           Consultant: Nic Syed NP Procedures: see electronic medical records for all procedures/Xrays and details which were not copied into this note but were reviewed prior to creation of Plan. LABS: 
Recent Labs  
  04/26/19 
0248 04/25/19 
1227 WBC 5.4 8.1 HGB 11.1* 13.1 HCT 34.7* 40.1  391 Recent Labs  
  04/26/19 
0248 04/25/19 
1227  134* K 3.7 4.4 * 103 CO2 28 25 BUN 7 10 CREA 0.68 0.71 GLU 97 168* CA 8.4* 9.1 MG  --  1.8 Recent Labs  
  04/26/19 
0248 04/25/19 
1227 SGOT 32 37 AP 91 120* TP 6.5 8.5* ALB 3.1* 4.0  
GLOB 3.4 4.5* AML 25  --   
LPSE 57* 59* No results for input(s): INR, PTP, APTT in the last 72 hours.  
 
No lab exists for component: INREXT  
 No results for input(s): FE, TIBC, PSAT, FERR in the last 72 hours. No results found for: FOL, RBCF No results for input(s): PH, PCO2, PO2 in the last 72 hours. No results for input(s): CPK, CKMB in the last 72 hours. No lab exists for component: TROPONINI Lab Results Component Value Date/Time Color YELLOW/STRAW 04/19/2019 09:43 PM  
 Appearance CLEAR 04/19/2019 09:43 PM  
 Specific gravity 1.010 04/19/2019 09:43 PM  
 Specific gravity 1.015 11/08/2011 01:05 PM  
 pH (UA) 5.5 04/19/2019 09:43 PM  
 Protein NEGATIVE  04/19/2019 09:43 PM  
 Glucose NEGATIVE  04/19/2019 09:43 PM  
 Ketone NEGATIVE  04/19/2019 09:43 PM  
 Bilirubin NEGATIVE  04/19/2019 09:43 PM  
 Urobilinogen 0.2 04/19/2019 09:43 PM  
 Nitrites NEGATIVE  04/19/2019 09:43 PM  
 Leukocyte Esterase NEGATIVE  04/19/2019 09:43 PM  
 Epithelial cells FEW 04/19/2019 09:43 PM  
 Bacteria NEGATIVE  04/19/2019 09:43 PM  
 WBC 0-4 04/19/2019 09:43 PM  
 RBC 0-5 04/19/2019 09:43 PM  
 
 
MEDICATIONS: 
Current Facility-Administered Medications Medication Dose Route Frequency  pantoprazole (PROTONIX) 40 mg in sodium chloride 0.9% 10 mL injection  40 mg IntraVENous Q12H  
 lactated Ringers infusion  125 mL/hr IntraVENous CONTINUOUS  
 insulin lispro (HUMALOG) injection   SubCUTAneous Q6H  
 glucose chewable tablet 16 g  4 Tab Oral PRN  
 dextrose (D50W) injection syrg 12.5-25 g  12.5-25 g IntraVENous PRN  
 glucagon (GLUCAGEN) injection 1 mg  1 mg IntraMUSCular PRN  
 morphine injection 2 mg  2 mg IntraVENous Q4H PRN  
 ondansetron (ZOFRAN) injection 4 mg  4 mg IntraVENous Q6H PRN  
 sodium chloride (NS) flush 5-40 mL  5-40 mL IntraVENous Q8H  
 sodium chloride (NS) flush 5-40 mL  5-40 mL IntraVENous PRN  
 acetaminophen (TYLENOL) tablet 650 mg  650 mg Oral Q6H PRN  
 gabapentin (NEURONTIN) capsule 600 mg  600 mg Oral BID  FLUoxetine (PROzac) capsule 60 mg  60 mg Oral Per Protocol  clonazePAM (KlonoPIN) tablet 4 mg  4 mg Oral QHS  atorvastatin (LIPITOR) tablet 40 mg  40 mg Oral DAILY  nicotine (NICODERM CQ) 7 mg/24 hr patch 1 Patch  1 Patch TransDERmal DAILY  prochlorperazine (COMPAZINE) with saline injection 5 mg  5 mg IntraVENous Q6H PRN

## 2019-04-26 NOTE — PROGRESS NOTES
Reason for Admission:   Abdominal Pain RRAT Score:   19 Do you (patient/family) have any concerns for transition/discharge? Pt voiced no concerns during initial assessment. Plan for utilizing home health:   Prior [facility] services in the past.  Pt requires some assistance w/ADL's. Current Advanced Directive/Advance Care Plan:  FULL Code. Voiced Advanced Medical Directive. Spouse is her decision maker. Likelihood of readmission? Moderate Transition of Care Plan: Pt to discharge home w/family. Pt to follow up w/PCP and all recommended specialists for post hospital discharge. SW to continue to assist.  
 
 
Care Management Interventions PCP Verified by CM: Yes(March 2019) Mode of Transport at Discharge: Other (see comment)(Family) Transition of Care Consult (CM Consult): Discharge Planning Discharge Durable Medical Equipment: (DME (cane, walker, wheelchair, rollator, and showefchair). No O2. ) Current Support Network: Lives with Spouse, Own Home(Lives in two story home w/5 steps to enter the front door.  ) Confirm Follow Up Transport: Family Plan discussed with Pt/Family/Caregiver: Yes Discharge Location Discharge Placement: (Home) [facility]

## 2019-04-26 NOTE — PROGRESS NOTES
Hospitalist Progress Note NAME: Guille Abreu :  1955 MRN:  [id number] Assessment / Plan: 
Epigastric pain, POA,Diarrhea with \"dark\" stool 
suspect smoldering pancreatitis (had stranding at pancreatic head on CT ) vs. Gastroenteritis or PUD. repeat abdominal CT  was normal.  Lipase has been normal.  
Continue IVF  
CLD today and advance as tolerated Continue  IV protonix bid. Hemoccult stool ordered Pending  stool culture, fecal leukocyte, C. Diff, O&P. 
considering EGD if no improvement DM type 2 
metformin and tujeo on hold . on Moderate dose SSI. HTN 
holding lisinopril. Pancreatitis has been associated with ACE-I Hyperlipidemia 
continue lipitor. Depression 
continue prozac Multiple sclerosis 
continue neurontin Tobacco use 
advised smoke cessation. Nicotine patch Body mass index is 26.47 kg/m².: 25.0 - 29.9 Overweight Code status: Full Prophylaxis: SCD's Recommended Disposition: Home w/Family Subjective: Chief Complaint / Reason for Physician Visit \"patient said I am still having the abdominal pain but much better , it is mainly epigastric\". Discussed with RN events overnight. Review of Systems: 
Symptom Y/N Comments  Symptom Y/N Comments Fever/Chills n   Chest Pain n   
Poor Appetite    Edema Cough n   Abdominal Pain y Sputum    Joint Pain SOB/FRYE n   Pruritis/Rash Nausea/vomit y   Tolerating PT/OT Diarrhea    Tolerating Diet Constipation    Other Could NOT obtain due to:   
 
Objective: VITALS:  
Last 24hrs VS reviewed since prior progress note. Most recent are: 
Patient Vitals for the past 24 hrs: 
 Temp Pulse Resp BP SpO2  
19 0759 98.7 °F (37.1 °C) 60 16 146/61 97 % 19 0338 98.4 °F (36.9 °C) (!) 57 16 117/68 93 % 19 2341 98.1 °F (36.7 °C) 65 18 129/77 100 % 19 1932 98.5 °F (36.9 °C) 63 18 140/77 95 % 19 1737 97.8 °F (36.6 °C) (!) 59 17 142/70 97 % 04/25/19 1700 98.5 °F (36.9 °C) 62 14 116/49 99 % 04/25/19 1645  62 17 127/73 95 % 04/25/19 1630  61 14 141/78 95 % 04/25/19 1600  77 17 128/85 95 % 04/25/19 1522  78 16 155/88 95 % 04/25/19 1521  90 25  94 % 04/25/19 1351  (!) 56 16  99 % 04/25/19 1345  (!) 56 15 140/72 99 % 04/25/19 1315  64 17 138/81 98 % 04/25/19 1300  64 17 127/75 93 % 04/25/19 1245  65 17 136/79 93 % 04/25/19 1240  65 17  95 % 04/25/19 1236    126/78   
04/25/19 1158 99 °F (37.2 °C) 80 18 (!) 154/96 100 % No intake or output data in the 24 hours ending 04/26/19 1015 PHYSICAL EXAM: 
General: WD, WN. Alert, cooperative, no acute distress   
EENT:  EOMI. Anicteric sclerae. MMM Resp:  CTA bilaterally, no wheezing or rales. No accessory muscle use CV:  Regular  rhythm,  No edema GI:  Soft, Non distended, Non tender.  +Bowel sounds 
 mild epigastric tenderness Neurologic:  Alert and oriented X 3, normal speech, Reviewed most current lab test results and cultures  YES Reviewed most current radiology test results   YES Review and summation of old records today    NO Reviewed patient's current orders and MAR    YES 
PMH/ reviewed - no change compared to H&P 
________________________________________________________________________ Care Plan discussed with: 
  Comments Patient y Family RN y   
Care Manager Consultant Multidiciplinary team rounds were held today with , nursing, pharmacist and clinical coordinator. Patient's plan of care was discussed; medications were reviewed and discharge planning was addressed. ________________________________________________________________________ Total NON critical care TIME:  35 minutes Total CRITICAL CARE TIME Spent:   Minutes non procedure based Comments >50% of visit spent in counseling and coordination of care ________________________________________________________________________ Brian Palmer MD  
 
Procedures: see electronic medical records for all procedures/Xrays and details which were not copied into this note but were reviewed prior to creation of Plan. LABS: 
I reviewed today's most current labs and imaging studies. Pertinent labs include: 
Recent Labs  
  04/26/19 0248 04/25/19 
1227 WBC 5.4 8.1 HGB 11.1* 13.1 HCT 34.7* 40.1  391 Recent Labs  
  04/26/19 
0248 04/25/19 
1227  134* K 3.7 4.4 * 103 CO2 28 25 GLU 97 168* BUN 7 10 CREA 0.68 0.71 CA 8.4* 9.1 MG  --  1.8 ALB 3.1* 4.0 TBILI 0.2 0.3 SGOT 32 37 ALT 43 58 Signed:  Brian Palmer MD

## 2019-04-26 NOTE — PROGRESS NOTES
Oral and Written notification given to patient and/or caregiver informing them that they are currently an Outpatient receiving care in our facility. Outpatient services include Observation Services. Seferino Francis [phone]

## 2019-04-26 NOTE — PROGRESS NOTES
Problem: Falls - Risk of 
Goal: *Absence of Falls Description Document Yusra Erickson Fall Risk and appropriate interventions in the flowsheet.  
Outcome: Progressing Towards Goal

## 2019-04-26 NOTE — INTERDISCIPLINARY ROUNDS
Oncology Interdisciplinary rounds were held today to discuss patient plan of care and outcomes. The following members were present: Nursing, Physician, Case Management, Pharmacy, and PT/OT Actual Length of Stay: 0 Expected Length of Stay: - - - Plan            Discharge Home with family

## 2019-04-27 LAB
ALBUMIN SERPL-MCNC: 3.5 G/DL (ref 3.5–5)
ALBUMIN/GLOB SERPL: 0.9 {RATIO} (ref 1.1–2.2)
ALP SERPL-CCNC: 106 U/L (ref 45–117)
ALT SERPL-CCNC: 47 U/L (ref 12–78)
ANION GAP SERPL CALC-SCNC: 3 MMOL/L (ref 5–15)
AST SERPL-CCNC: 40 U/L (ref 15–37)
BASOPHILS # BLD: 0 K/UL (ref 0–0.1)
BASOPHILS NFR BLD: 1 % (ref 0–1)
BILIRUB SERPL-MCNC: 0.4 MG/DL (ref 0.2–1)
BUN SERPL-MCNC: 5 MG/DL (ref 6–20)
BUN/CREAT SERPL: 8 (ref 12–20)
CALCIUM SERPL-MCNC: 9 MG/DL (ref 8.5–10.1)
CHLORIDE SERPL-SCNC: 109 MMOL/L (ref 97–108)
CO2 SERPL-SCNC: 27 MMOL/L (ref 21–32)
CREAT SERPL-MCNC: 0.62 MG/DL (ref 0.55–1.02)
DIFFERENTIAL METHOD BLD: ABNORMAL
EOSINOPHIL # BLD: 0.2 K/UL (ref 0–0.4)
EOSINOPHIL NFR BLD: 3 % (ref 0–7)
ERYTHROCYTE [DISTWIDTH] IN BLOOD BY AUTOMATED COUNT: 13.4 % (ref 11.5–14.5)
GLOBULIN SER CALC-MCNC: 3.9 G/DL (ref 2–4)
GLUCOSE BLD STRIP.AUTO-MCNC: 103 MG/DL (ref 65–100)
GLUCOSE BLD STRIP.AUTO-MCNC: 104 MG/DL (ref 65–100)
GLUCOSE BLD STRIP.AUTO-MCNC: 187 MG/DL (ref 65–100)
GLUCOSE BLD STRIP.AUTO-MCNC: 97 MG/DL (ref 65–100)
GLUCOSE SERPL-MCNC: 92 MG/DL (ref 65–100)
HCT VFR BLD AUTO: 39.3 % (ref 35–47)
HGB BLD-MCNC: 12.2 G/DL (ref 11.5–16)
IMM GRANULOCYTES # BLD AUTO: 0 K/UL (ref 0–0.04)
IMM GRANULOCYTES NFR BLD AUTO: 0 % (ref 0–0.5)
LYMPHOCYTES # BLD: 2.9 K/UL (ref 0.8–3.5)
LYMPHOCYTES NFR BLD: 46 % (ref 12–49)
MCH RBC QN AUTO: 26.7 PG (ref 26–34)
MCHC RBC AUTO-ENTMCNC: 31 G/DL (ref 30–36.5)
MCV RBC AUTO: 86 FL (ref 80–99)
MONOCYTES # BLD: 0.4 K/UL (ref 0–1)
MONOCYTES NFR BLD: 6 % (ref 5–13)
NEUTS SEG # BLD: 2.7 K/UL (ref 1.8–8)
NEUTS SEG NFR BLD: 44 % (ref 32–75)
NRBC # BLD: 0 K/UL (ref 0–0.01)
NRBC BLD-RTO: 0 PER 100 WBC
PLATELET # BLD AUTO: 325 K/UL (ref 150–400)
PMV BLD AUTO: 8.7 FL (ref 8.9–12.9)
POTASSIUM SERPL-SCNC: 4 MMOL/L (ref 3.5–5.1)
PROT SERPL-MCNC: 7.4 G/DL (ref 6.4–8.2)
RBC # BLD AUTO: 4.57 M/UL (ref 3.8–5.2)
SERVICE CMNT-IMP: ABNORMAL
SERVICE CMNT-IMP: NORMAL
SODIUM SERPL-SCNC: 139 MMOL/L (ref 136–145)
WBC # BLD AUTO: 6.1 K/UL (ref 3.6–11)

## 2019-04-27 PROCEDURE — 74011250637 HC RX REV CODE- 250/637: Performed by: HOSPITALIST

## 2019-04-27 PROCEDURE — 80053 COMPREHEN METABOLIC PANEL: CPT

## 2019-04-27 PROCEDURE — 94760 N-INVAS EAR/PLS OXIMETRY 1: CPT

## 2019-04-27 PROCEDURE — 82962 GLUCOSE BLOOD TEST: CPT

## 2019-04-27 PROCEDURE — 74011250636 HC RX REV CODE- 250/636: Performed by: HOSPITALIST

## 2019-04-27 PROCEDURE — 74011000250 HC RX REV CODE- 250: Performed by: NURSE PRACTITIONER

## 2019-04-27 PROCEDURE — 74011000250 HC RX REV CODE- 250: Performed by: HOSPITALIST

## 2019-04-27 PROCEDURE — 65660000000 HC RM CCU STEPDOWN

## 2019-04-27 PROCEDURE — 85025 COMPLETE CBC W/AUTO DIFF WBC: CPT

## 2019-04-27 PROCEDURE — C9113 INJ PANTOPRAZOLE SODIUM, VIA: HCPCS | Performed by: NURSE PRACTITIONER

## 2019-04-27 PROCEDURE — 74011636637 HC RX REV CODE- 636/637: Performed by: HOSPITALIST

## 2019-04-27 PROCEDURE — 36415 COLL VENOUS BLD VENIPUNCTURE: CPT

## 2019-04-27 PROCEDURE — 74011250636 HC RX REV CODE- 250/636: Performed by: NURSE PRACTITIONER

## 2019-04-27 RX ADMIN — MORPHINE SULFATE 2 MG: 2 INJECTION, SOLUTION INTRAMUSCULAR; INTRAVENOUS at 21:49

## 2019-04-27 RX ADMIN — PROCHLORPERAZINE EDISYLATE 5 MG: 5 INJECTION INTRAMUSCULAR; INTRAVENOUS at 13:43

## 2019-04-27 RX ADMIN — GABAPENTIN 600 MG: 300 CAPSULE ORAL at 09:29

## 2019-04-27 RX ADMIN — Medication 10 ML: at 17:53

## 2019-04-27 RX ADMIN — Medication 10 ML: at 05:23

## 2019-04-27 RX ADMIN — MORPHINE SULFATE 2 MG: 2 INJECTION, SOLUTION INTRAMUSCULAR; INTRAVENOUS at 05:23

## 2019-04-27 RX ADMIN — SODIUM CHLORIDE, SODIUM LACTATE, POTASSIUM CHLORIDE, AND CALCIUM CHLORIDE 125 ML/HR: 600; 310; 30; 20 INJECTION, SOLUTION INTRAVENOUS at 09:23

## 2019-04-27 RX ADMIN — MORPHINE SULFATE 2 MG: 2 INJECTION, SOLUTION INTRAMUSCULAR; INTRAVENOUS at 13:43

## 2019-04-27 RX ADMIN — PANTOPRAZOLE SODIUM 40 MG: 40 INJECTION, POWDER, FOR SOLUTION INTRAVENOUS at 09:26

## 2019-04-27 RX ADMIN — MORPHINE SULFATE 2 MG: 2 INJECTION, SOLUTION INTRAMUSCULAR; INTRAVENOUS at 01:07

## 2019-04-27 RX ADMIN — PROCHLORPERAZINE EDISYLATE 5 MG: 5 INJECTION INTRAMUSCULAR; INTRAVENOUS at 05:23

## 2019-04-27 RX ADMIN — GABAPENTIN 600 MG: 300 CAPSULE ORAL at 17:52

## 2019-04-27 RX ADMIN — SODIUM CHLORIDE, SODIUM LACTATE, POTASSIUM CHLORIDE, AND CALCIUM CHLORIDE 125 ML/HR: 600; 310; 30; 20 INJECTION, SOLUTION INTRAVENOUS at 18:53

## 2019-04-27 RX ADMIN — ATORVASTATIN CALCIUM 40 MG: 40 TABLET, FILM COATED ORAL at 09:00

## 2019-04-27 RX ADMIN — MORPHINE SULFATE 2 MG: 2 INJECTION, SOLUTION INTRAMUSCULAR; INTRAVENOUS at 17:52

## 2019-04-27 RX ADMIN — Medication 10 ML: at 21:43

## 2019-04-27 RX ADMIN — PANTOPRAZOLE SODIUM 40 MG: 40 INJECTION, POWDER, FOR SOLUTION INTRAVENOUS at 21:43

## 2019-04-27 RX ADMIN — INSULIN LISPRO 2 UNITS: 100 INJECTION, SOLUTION INTRAVENOUS; SUBCUTANEOUS at 17:53

## 2019-04-27 RX ADMIN — CLONAZEPAM 4 MG: 1 TABLET ORAL at 21:43

## 2019-04-27 RX ADMIN — MORPHINE SULFATE 2 MG: 2 INJECTION, SOLUTION INTRAMUSCULAR; INTRAVENOUS at 09:21

## 2019-04-27 RX ADMIN — PROCHLORPERAZINE EDISYLATE 5 MG: 5 INJECTION INTRAMUSCULAR; INTRAVENOUS at 21:43

## 2019-04-27 NOTE — PROGRESS NOTES
Hospitalist Progress Note NAME: Brannon Garrett :  1955 MRN:  [id number] Assessment / Plan: 
Epigastric pain, POA Diarrhea with \"dark\" stool Nausea 
--suspect smoldering pancreatitis (had stranding at pancreatic head on CT ) vs. Gastroenteritis or PUD. --repeat abdominal CT  was normal.  Lipase has been normal.  Hgb been normal/stable 
-- IVF with LR, pain control with prn morphine, prn zofran. NPO except ice chips and meds, did not tolorate clears yesterday by sxs, lipase remains normal 
--GI consult appreciated and started IV protonix bid. Hemoccult stool ordered --check stool culture, fecal leukocyte, O&P. Cancel c diff, no further bms 
--considering EGD if no improvement 
-H/h stable 
-consider reglan for cont N 
-await further recs from gi 
  
 
DM type 2 
--hold metformin and tujeo while npo. Moderate dose SSI. 
  
HTN 
--hold lisinopril. Pancreatitis has been associated with ACE-I 
  
Hyperlipidemia 
--continue lipitor.   
-trig 121l 
  
Depression 
--continue prozac 
  
Multiple sclerosis --continue neurontin 
  
Tobacco use 
--advised smoke cessation. Nicotine patch 
  
Code:  full DVT prophylaxis: SCD Surrogate decision maker:   25.0 - 29.9 Overweight / Body mass index is 26.47 kg/m². Recommended Disposition: Home w/Family Subjective: Chief Complaint / Reason for Physician Visit 
abd pain,n/v/d Patient reports that her epigastric discomfort, which she describes as burning, worsened after clear liquid diet yesterday at lunch. She did not eat anything at dinner. Has had no diarrhea since admission. Admits to persistent nausea but no vomiting. The pain worsened with eating but is still persistent only thing that makes it better is some morphine. She denies any chest pain or shortness of breath. Patient was evaluated at 7:45 AM 
 
 
Discussed with RN events overnight. Review of Systems: 
Symptom Y/N Comments  Symptom Y/N Comments Fever/Chills    Chest Pain n   
Poor Appetite y   Edema Cough    Abdominal Pain y Sputum    Joint Pain SOB/FRYE n   Pruritis/Rash Nausea/vomit y   Tolerating PT/OT Diarrhea y better  Tolerating Diet n   
Constipation    Other Could NOT obtain due to:   
 
Objective: VITALS:  
Last 24hrs VS reviewed since prior progress note. Most recent are: 
Patient Vitals for the past 24 hrs: 
 Temp Pulse Resp BP SpO2  
04/27/19 1625 98.6 °F (37 °C) 72 16 142/78 98 % 04/27/19 1116 98.7 °F (37.1 °C) 61 16 146/83 97 % 04/27/19 0816 98.7 °F (37.1 °C) 71 16 154/79 96 % 04/27/19 0324 98.4 °F (36.9 °C) 78 18 140/82 100 % 04/26/19 2230 97.7 °F (36.5 °C) (!) 58 18 142/71 97 % 04/26/19 2038 98 °F (36.7 °C) 62 18 156/59 98 % Intake/Output Summary (Last 24 hours) at 4/27/2019 1755 Last data filed at 4/27/2019 0330 Gross per 24 hour Intake 2710.42 ml Output  Net 2710.42 ml PHYSICAL EXAM: 
Patient is awake alert nontoxic-appearing oriented x3 no distress. Pleasant, pink mucous memory most.  Cardiovascular regular rate and gallops. Lungs are clear no wheezes rhonchi crackles. Abdomen bowel sounds present soft tenderness with some guarding in epigastric region no rebound extremities no clubbing cyanosis or edema Reviewed most current lab test results and cultures  YES Reviewed most current radiology test results   YES Review and summation of old records today    NO Reviewed patient's current orders and MAR    YES 
PMH/ reviewed - no change compared to H&P 
________________________________________________________________________ Care Plan discussed with: 
  Comments Patient y Family RN y   
Care Manager Consultant Multidiciplinary team rounds were held today with , nursing, pharmacist and clinical coordinator. Patient's plan of care was discussed; medications were reviewed and discharge planning was addressed. ________________________________________________________________________ Total NON critical care TIME:  Minutes Total CRITICAL CARE TIME Spent:   Minutes non procedure based Comments >50% of visit spent in counseling and coordination of care    
________________________________________________________________________ Nathalia Martin MD  
 
Procedures: see electronic medical records for all procedures/Xrays and details which were not copied into this note but were reviewed prior to creation of Plan. LABS: 
I reviewed today's most current labs and imaging studies. Pertinent labs include: 
Recent Labs  
  04/27/19 
0332 04/26/19 
0248 04/25/19 
1227 WBC 6.1 5.4 8.1 HGB 12.2 11.1* 13.1 HCT 39.3 34.7* 40.1  283 391 Recent Labs  
  04/27/19 
0332 04/26/19 
0248 04/25/19 
1227  141 134* K 4.0 3.7 4.4 * 110* 103 CO2 27 28 25 GLU 92 97 168* BUN 5* 7 10 CREA 0.62 0.68 0.71 CA 9.0 8.4* 9.1 MG  --   --  1.8 ALB 3.5 3.1* 4.0 TBILI 0.4 0.2 0.3 SGOT 40* 32 37 ALT 47 43 58 Signed: Nathalia Martin MD

## 2019-04-27 NOTE — PROGRESS NOTES
Per infectious disease, it is okay to discontinue orders to collect stool samples and remove isolation precautions. Pt has not had a bowel movement for a couple of days and it is not necessary for c-diff precautions.

## 2019-04-27 NOTE — PROGRESS NOTES
Oncology End of Shift Note Bedside shift change report given to Everardo Hector RN (incoming nurse) by Shola Huntley (outgoing nurse) on [facility] Matt. Report included the following information SBAR. Shift Summary: Pt requested PRN pain medications 3 times during shift. She otherwise rested comfortably. She also requested to have her  advanced; she is now on a clear diet and stated that she tolerated well Issues for Physician to Address:   
 
Patient on Cardiac Monitoring? [x] Yes 
[] No 
 
Rhythm: Telemetry: normal sinus rhythm Shift Events Shola Huntley

## 2019-04-27 NOTE — PROGRESS NOTES
Oncology End of Shift Note Bedside shift change report given to Yaya Victoria RN (incoming nurse) by Cassi Hernandez (outgoing nurse) on Radha Hernandez. Report included the following information SBAR, Kardex, Intake/Output and MAR. Shift Summary: Pt remained stable during shift. Hypoglycemic episode at beginning of shift. Morphine x3, Compazine x2. NPO through out night. Issues for Physician to Address:  Diet order for today? Patient on Cardiac Monitoring? [x] Yes 
[] No 
 
Rhythm: Telemetry: normal sinus rhythm Cassi Hernandez

## 2019-04-27 NOTE — PROGRESS NOTES
Oncology End of Shift Note Bedside shift change report given to Moses Shanks RN (incoming nurse) by Nahum Pereira (outgoing nurse) on [facility]. Report included the following information SBAR, Kardex and MAR. Shift Summary:  
Patient tolerated care well. Family present at bedside periodically. Patients diet increase. Patient complained of pain after diet. GI MD contacted and diet reverted back to NPO. Issues for Physician to Address:   
 
Patient on Cardiac Monitoring? [x] Yes 
[] No 
 
Rhythm: Telemetry: normal sinus rhythm Shift Events Nahum Pereira

## 2019-04-28 LAB
ALBUMIN SERPL-MCNC: 3.3 G/DL (ref 3.5–5)
ALBUMIN/GLOB SERPL: 0.9 {RATIO} (ref 1.1–2.2)
ALP SERPL-CCNC: 93 U/L (ref 45–117)
ALT SERPL-CCNC: 40 U/L (ref 12–78)
AMYLASE SERPL-CCNC: 19 U/L (ref 25–115)
ANION GAP SERPL CALC-SCNC: 4 MMOL/L (ref 5–15)
AST SERPL-CCNC: 34 U/L (ref 15–37)
BILIRUB DIRECT SERPL-MCNC: 0.1 MG/DL (ref 0–0.2)
BILIRUB SERPL-MCNC: 0.4 MG/DL (ref 0.2–1)
BUN SERPL-MCNC: 5 MG/DL (ref 6–20)
BUN/CREAT SERPL: 8 (ref 12–20)
CALCIUM SERPL-MCNC: 8.8 MG/DL (ref 8.5–10.1)
CHLORIDE SERPL-SCNC: 109 MMOL/L (ref 97–108)
CO2 SERPL-SCNC: 26 MMOL/L (ref 21–32)
CREAT SERPL-MCNC: 0.59 MG/DL (ref 0.55–1.02)
GLOBULIN SER CALC-MCNC: 3.6 G/DL (ref 2–4)
GLUCOSE BLD STRIP.AUTO-MCNC: 120 MG/DL (ref 65–100)
GLUCOSE BLD STRIP.AUTO-MCNC: 126 MG/DL (ref 65–100)
GLUCOSE BLD STRIP.AUTO-MCNC: 133 MG/DL (ref 65–100)
GLUCOSE BLD STRIP.AUTO-MCNC: 234 MG/DL (ref 65–100)
GLUCOSE SERPL-MCNC: 118 MG/DL (ref 65–100)
LIPASE SERPL-CCNC: 41 U/L (ref 73–393)
MAGNESIUM SERPL-MCNC: 1.8 MG/DL (ref 1.6–2.4)
PHOSPHATE SERPL-MCNC: 4.1 MG/DL (ref 2.6–4.7)
POTASSIUM SERPL-SCNC: 4 MMOL/L (ref 3.5–5.1)
PROT SERPL-MCNC: 6.9 G/DL (ref 6.4–8.2)
SERVICE CMNT-IMP: ABNORMAL
SODIUM SERPL-SCNC: 139 MMOL/L (ref 136–145)

## 2019-04-28 PROCEDURE — 65660000000 HC RM CCU STEPDOWN

## 2019-04-28 PROCEDURE — 74011250637 HC RX REV CODE- 250/637: Performed by: EMERGENCY MEDICINE

## 2019-04-28 PROCEDURE — 94760 N-INVAS EAR/PLS OXIMETRY 1: CPT

## 2019-04-28 PROCEDURE — 80076 HEPATIC FUNCTION PANEL: CPT

## 2019-04-28 PROCEDURE — 74011250637 HC RX REV CODE- 250/637: Performed by: HOSPITALIST

## 2019-04-28 PROCEDURE — 82962 GLUCOSE BLOOD TEST: CPT

## 2019-04-28 PROCEDURE — 80048 BASIC METABOLIC PNL TOTAL CA: CPT

## 2019-04-28 PROCEDURE — 74011000250 HC RX REV CODE- 250: Performed by: HOSPITALIST

## 2019-04-28 PROCEDURE — 83690 ASSAY OF LIPASE: CPT

## 2019-04-28 PROCEDURE — 74011000250 HC RX REV CODE- 250: Performed by: NURSE PRACTITIONER

## 2019-04-28 PROCEDURE — 74011636637 HC RX REV CODE- 636/637: Performed by: HOSPITALIST

## 2019-04-28 PROCEDURE — 84100 ASSAY OF PHOSPHORUS: CPT

## 2019-04-28 PROCEDURE — C9113 INJ PANTOPRAZOLE SODIUM, VIA: HCPCS | Performed by: NURSE PRACTITIONER

## 2019-04-28 PROCEDURE — 36415 COLL VENOUS BLD VENIPUNCTURE: CPT

## 2019-04-28 PROCEDURE — 82150 ASSAY OF AMYLASE: CPT

## 2019-04-28 PROCEDURE — 83735 ASSAY OF MAGNESIUM: CPT

## 2019-04-28 PROCEDURE — 74011250636 HC RX REV CODE- 250/636: Performed by: NURSE PRACTITIONER

## 2019-04-28 PROCEDURE — 74011250636 HC RX REV CODE- 250/636: Performed by: HOSPITALIST

## 2019-04-28 RX ORDER — METOCLOPRAMIDE 10 MG/1
5 TABLET ORAL
Status: DISCONTINUED | OUTPATIENT
Start: 2019-04-28 | End: 2019-04-29 | Stop reason: HOSPADM

## 2019-04-28 RX ORDER — FLUOXETINE HYDROCHLORIDE 20 MG/1
60 CAPSULE ORAL DAILY
Status: DISCONTINUED | OUTPATIENT
Start: 2019-04-28 | End: 2019-04-29 | Stop reason: HOSPADM

## 2019-04-28 RX ORDER — MORPHINE SULFATE 2 MG/ML
2 INJECTION, SOLUTION INTRAMUSCULAR; INTRAVENOUS
Status: DISCONTINUED | OUTPATIENT
Start: 2019-04-28 | End: 2019-04-29 | Stop reason: HOSPADM

## 2019-04-28 RX ORDER — OXYCODONE AND ACETAMINOPHEN 5; 325 MG/1; MG/1
1 TABLET ORAL
Status: DISCONTINUED | OUTPATIENT
Start: 2019-04-28 | End: 2019-04-29 | Stop reason: HOSPADM

## 2019-04-28 RX ADMIN — GABAPENTIN 600 MG: 300 CAPSULE ORAL at 18:09

## 2019-04-28 RX ADMIN — OXYCODONE AND ACETAMINOPHEN 1 TABLET: 5; 325 TABLET ORAL at 11:14

## 2019-04-28 RX ADMIN — PROCHLORPERAZINE EDISYLATE 5 MG: 5 INJECTION INTRAMUSCULAR; INTRAVENOUS at 06:18

## 2019-04-28 RX ADMIN — PANTOPRAZOLE SODIUM 40 MG: 40 INJECTION, POWDER, FOR SOLUTION INTRAVENOUS at 21:41

## 2019-04-28 RX ADMIN — MORPHINE SULFATE 2 MG: 2 INJECTION, SOLUTION INTRAMUSCULAR; INTRAVENOUS at 06:18

## 2019-04-28 RX ADMIN — PANTOPRAZOLE SODIUM 40 MG: 40 INJECTION, POWDER, FOR SOLUTION INTRAVENOUS at 09:08

## 2019-04-28 RX ADMIN — FLUOXETINE 60 MG: 20 CAPSULE ORAL at 12:47

## 2019-04-28 RX ADMIN — Medication 10 ML: at 21:42

## 2019-04-28 RX ADMIN — ATORVASTATIN CALCIUM 40 MG: 40 TABLET, FILM COATED ORAL at 09:07

## 2019-04-28 RX ADMIN — OXYCODONE AND ACETAMINOPHEN 1 TABLET: 5; 325 TABLET ORAL at 16:07

## 2019-04-28 RX ADMIN — OXYCODONE AND ACETAMINOPHEN 1 TABLET: 5; 325 TABLET ORAL at 20:19

## 2019-04-28 RX ADMIN — INSULIN LISPRO 3 UNITS: 100 INJECTION, SOLUTION INTRAVENOUS; SUBCUTANEOUS at 13:37

## 2019-04-28 RX ADMIN — SODIUM CHLORIDE, SODIUM LACTATE, POTASSIUM CHLORIDE, AND CALCIUM CHLORIDE 125 ML/HR: 600; 310; 30; 20 INJECTION, SOLUTION INTRAVENOUS at 21:41

## 2019-04-28 RX ADMIN — SODIUM CHLORIDE, SODIUM LACTATE, POTASSIUM CHLORIDE, AND CALCIUM CHLORIDE 125 ML/HR: 600; 310; 30; 20 INJECTION, SOLUTION INTRAVENOUS at 13:35

## 2019-04-28 RX ADMIN — Medication 10 ML: at 06:08

## 2019-04-28 RX ADMIN — CLONAZEPAM 4 MG: 1 TABLET ORAL at 21:42

## 2019-04-28 RX ADMIN — PROCHLORPERAZINE EDISYLATE 5 MG: 5 INJECTION INTRAMUSCULAR; INTRAVENOUS at 20:19

## 2019-04-28 RX ADMIN — GABAPENTIN 600 MG: 300 CAPSULE ORAL at 09:08

## 2019-04-28 RX ADMIN — Medication 10 ML: at 13:37

## 2019-04-28 NOTE — PROGRESS NOTES
Hospitalist Progress Note NAME: Anyi Siegel :  1955 MRN:  [id number] Assessment / Plan: 
Epigastric pain, POA Diarrhea with \"dark\" stool Nausea 
--suspect smoldering pancreatitis (had stranding at pancreatic head on CT ) vs. Gastroenteritis or PUD. --repeat abdominal CT  was normal.  Lipase has been normal.  Hgb been normal/stable 
-- IVF with LR, pain control with prn morphine, prn zofran.  cont clears, lipase remains normal, advance diet per gi 
--GI consult appreciated and started IV protonix bid. Hemoccult stool ordered --check stool culture, fecal leukocyte, O&P. Cancel c diff, no further bms 
--considering EGD if no improvement, will make NPO MN in case can be done in am 
-H/h stable 
-will try reglan for cont N 
-await further recs from gi 
-decrease IV morphine and transition to percocet po for pain and wean pain meds if possible 
  
 
DM type 2 
--hold metformin and tujeo while npo. Moderate dose SSI. 
  
HTN 
--hold lisinopril. Pancreatitis has been associated with ACE-I 
  
Hyperlipidemia 
--continue lipitor.   
-trig 121 
  
Depression 
--continue prozac 
  
Multiple sclerosis --continue neurontin 
  
Tobacco use 
--advised smoke cessation. Nicotine patch 
  
Code:  full DVT prophylaxis: SCD Surrogate decision maker:   25.0 - 29.9 Overweight / Body mass index is 26.47 kg/m². Recommended Disposition: Home w/Family Subjective: Chief Complaint / Reason for Physician Visit 
abd pain,n/v/d 
 
 Patient reports that her epigastric discomfort, which she describes as burning, worsened after clear liquid diet yesterday at lunch. She did not eat anything at dinner. Has had no diarrhea since admission. Admits to persistent nausea but no vomiting. The pain worsened with eating but is still persistent only thing that makes it better is some morphine. She denies any chest pain or shortness of breath. 4/28 pt tried clears again last night, did ok. Still some N and abd pain. Will try to wean off IV narcotics Patient was evaluated at 7:30 AM 
 
 
Discussed with RN events overnight. Review of Systems: 
Symptom Y/N Comments  Symptom Y/N Comments Fever/Chills    Chest Pain n   
Poor Appetite y   Edema Cough    Abdominal Pain y better Sputum    Joint Pain SOB/FRYE n   Pruritis/Rash Nausea/vomit y better  Tolerating PT/OT Diarrhea n   Tolerating Diet n   
Constipation    Other Could NOT obtain due to:   
 
Objective: VITALS:  
Last 24hrs VS reviewed since prior progress note. Most recent are: 
Patient Vitals for the past 24 hrs: 
 Temp Pulse Resp BP SpO2  
04/28/19 1107 98.2 °F (36.8 °C) 61 14 134/71 98 % 04/28/19 0904 98 °F (36.7 °C) 73 16 157/77   
04/28/19 0805 98.4 °F (36.9 °C) 67 16 163/90 96 % 04/28/19 0313 98.4 °F (36.9 °C) 77 16 145/82 96 % 04/27/19 2325 97.9 °F (36.6 °C) 77 16 147/80 100 % 04/27/19 1854 98.7 °F (37.1 °C) 78 16 141/76 98 % 04/27/19 1625 98.6 °F (37 °C) 72 16 142/78 98 % Intake/Output Summary (Last 24 hours) at 4/28/2019 1237 Last data filed at 4/28/2019 4488 Gross per 24 hour Intake 2964.59 ml Output  Net 2964.59 ml PHYSICAL EXAM: 
Patient is awake alert nontoxic-appearing oriented x3 no distress. Pleasant, pink mucous membranes moist.  Cardiovascular regular rate and gallops. Lungs are clear no wheezes rhonchi crackles. Abdomen bowel sounds present soft tenderness with some guarding in epigastric region no rebound extremities no clubbing cyanosis or edema Reviewed most current lab test results and cultures  YES Reviewed most current radiology test results   YES Review and summation of old records today    NO Reviewed patient's current orders and MAR    YES 
PMH/SH reviewed - no change compared to H&P 
________________________________________________________________________ Care Plan discussed with: 
  Comments Patient y Family RN y   
Care Manager Consultant Multidiciplinary team rounds were held today with , nursing, pharmacist and clinical coordinator. Patient's plan of care was discussed; medications were reviewed and discharge planning was addressed. ________________________________________________________________________ Total NON critical care TIME:  Minutes Total CRITICAL CARE TIME Spent:   Minutes non procedure based Comments >50% of visit spent in counseling and coordination of care    
________________________________________________________________________ Marcio Ma MD  
 
Procedures: see electronic medical records for all procedures/Xrays and details which were not copied into this note but were reviewed prior to creation of Plan. LABS: 
I reviewed today's most current labs and imaging studies. Pertinent labs include: 
Recent Labs  
  04/27/19 
0332 04/26/19 
0248 WBC 6.1 5.4 HGB 12.2 11.1*  
HCT 39.3 34.7*  
 283 Recent Labs  
  04/28/19 
0310 04/27/19 
5996 04/26/19 
0248  139 141  
K 4.0 4.0 3.7 * 109* 110* CO2 26 27 28 * 92 97 BUN 5* 5* 7 CREA 0.59 0.62 0.68  
CA 8.8 9.0 8.4* MG 1.8  --   --   
PHOS 4.1  --   --   
ALB 3.3* 3.5 3.1* TBILI 0.4 0.4 0.2 SGOT 34 40* 32 ALT 40 47 43 Signed: Marcio Ma MD

## 2019-04-28 NOTE — PROGRESS NOTES
Oncology End of Shift Note Bedside shift change report given to JIMBO mireles (incoming nurse) by Stefano Segura (outgoing nurse) on [facility]. Report included the following information SBAR, Kardex and MAR. Shift Summary:  
Patient tolerated care well. GI called multiple times throughout shift. GIs plan is to continue clear diet and possibly advance tomorrow. MD informed of plan. Patient ambulated in t[place]. Patient complained of pain twice throughout shift (treated with repositioning, rest, and meds, see MAR). Issues for Physician to Address:   
 
Patient on Cardiac Monitoring? [x] Yes 
[] No 
 
Rhythm: Sinus Mikle Fuelling Shift Events Stefano Segura

## 2019-04-28 NOTE — PROGRESS NOTES
Oncology End of Shift Note Bedside shift change report given to JIMBO Tran (incoming nurse) by Rahel Muñoz (outgoing nurse) on [facility]. Report included the following information SBAR, Kardex, Intake/Output and MAR. Shift Summary: Pt remained stable during shift. Pain and Nausea medication x2. Labs drawn. No major complaints. No sliding scale coverage needed. Issues for Physician to Address:   
 
Patient on Cardiac Monitoring? [x] Yes 
[] No 
 
Rhythm: Telemetry: normal sinus rhythm Rahel Muñoz

## 2019-04-29 ENCOUNTER — ANESTHESIA (OUTPATIENT)
Dept: ENDOSCOPY | Age: 64
DRG: 440 | End: 2019-04-29
Payer: MEDICARE

## 2019-04-29 ENCOUNTER — ANESTHESIA EVENT (OUTPATIENT)
Dept: ENDOSCOPY | Age: 64
DRG: 440 | End: 2019-04-29
Payer: MEDICARE

## 2019-04-29 VITALS
HEART RATE: 57 BPM | BODY MASS INDEX: 26.55 KG/M2 | WEIGHT: 179.23 LBS | SYSTOLIC BLOOD PRESSURE: 160 MMHG | TEMPERATURE: 98 F | RESPIRATION RATE: 16 BRPM | HEIGHT: 69 IN | OXYGEN SATURATION: 99 % | DIASTOLIC BLOOD PRESSURE: 76 MMHG

## 2019-04-29 LAB
ALBUMIN SERPL-MCNC: 3.3 G/DL (ref 3.5–5)
ALBUMIN/GLOB SERPL: 1 {RATIO} (ref 1.1–2.2)
ALP SERPL-CCNC: 94 U/L (ref 45–117)
ALT SERPL-CCNC: 35 U/L (ref 12–78)
ANION GAP SERPL CALC-SCNC: 4 MMOL/L (ref 5–15)
AST SERPL-CCNC: 32 U/L (ref 15–37)
BILIRUB DIRECT SERPL-MCNC: 0.1 MG/DL (ref 0–0.2)
BILIRUB SERPL-MCNC: 0.5 MG/DL (ref 0.2–1)
BUN SERPL-MCNC: 6 MG/DL (ref 6–20)
BUN/CREAT SERPL: 10 (ref 12–20)
CALCIUM SERPL-MCNC: 8.7 MG/DL (ref 8.5–10.1)
CHLORIDE SERPL-SCNC: 110 MMOL/L (ref 97–108)
CO2 SERPL-SCNC: 27 MMOL/L (ref 21–32)
CREAT SERPL-MCNC: 0.61 MG/DL (ref 0.55–1.02)
GLOBULIN SER CALC-MCNC: 3.4 G/DL (ref 2–4)
GLUCOSE BLD STRIP.AUTO-MCNC: 118 MG/DL (ref 65–100)
GLUCOSE BLD STRIP.AUTO-MCNC: 126 MG/DL (ref 65–100)
GLUCOSE BLD STRIP.AUTO-MCNC: 135 MG/DL (ref 65–100)
GLUCOSE SERPL-MCNC: 150 MG/DL (ref 65–100)
LIPASE SERPL-CCNC: 40 U/L (ref 73–393)
MAGNESIUM SERPL-MCNC: 2 MG/DL (ref 1.6–2.4)
PHOSPHATE SERPL-MCNC: 3.4 MG/DL (ref 2.6–4.7)
POTASSIUM SERPL-SCNC: 4 MMOL/L (ref 3.5–5.1)
PROT SERPL-MCNC: 6.7 G/DL (ref 6.4–8.2)
SERVICE CMNT-IMP: ABNORMAL
SODIUM SERPL-SCNC: 141 MMOL/L (ref 136–145)

## 2019-04-29 PROCEDURE — 76040000019: Performed by: INTERNAL MEDICINE

## 2019-04-29 PROCEDURE — 88305 TISSUE EXAM BY PATHOLOGIST: CPT

## 2019-04-29 PROCEDURE — 74011250636 HC RX REV CODE- 250/636: Performed by: HOSPITALIST

## 2019-04-29 PROCEDURE — 77030019988 HC FCPS ENDOSC DISP BSC -B: Performed by: INTERNAL MEDICINE

## 2019-04-29 PROCEDURE — 74011000250 HC RX REV CODE- 250: Performed by: HOSPITALIST

## 2019-04-29 PROCEDURE — 80076 HEPATIC FUNCTION PANEL: CPT

## 2019-04-29 PROCEDURE — 74011250636 HC RX REV CODE- 250/636: Performed by: INTERNAL MEDICINE

## 2019-04-29 PROCEDURE — 80048 BASIC METABOLIC PNL TOTAL CA: CPT

## 2019-04-29 PROCEDURE — 36415 COLL VENOUS BLD VENIPUNCTURE: CPT

## 2019-04-29 PROCEDURE — 74011250637 HC RX REV CODE- 250/637: Performed by: HOSPITALIST

## 2019-04-29 PROCEDURE — 84100 ASSAY OF PHOSPHORUS: CPT

## 2019-04-29 PROCEDURE — 76060000031 HC ANESTHESIA FIRST 0.5 HR: Performed by: INTERNAL MEDICINE

## 2019-04-29 PROCEDURE — 0DB68ZX EXCISION OF STOMACH, VIA NATURAL OR ARTIFICIAL OPENING ENDOSCOPIC, DIAGNOSTIC: ICD-10-PCS | Performed by: ORTHOPAEDIC SURGERY

## 2019-04-29 PROCEDURE — 83690 ASSAY OF LIPASE: CPT

## 2019-04-29 PROCEDURE — 83735 ASSAY OF MAGNESIUM: CPT

## 2019-04-29 PROCEDURE — 74011250636 HC RX REV CODE- 250/636: Performed by: NURSE PRACTITIONER

## 2019-04-29 PROCEDURE — 74011250636 HC RX REV CODE- 250/636

## 2019-04-29 PROCEDURE — 82962 GLUCOSE BLOOD TEST: CPT

## 2019-04-29 PROCEDURE — 74011250637 HC RX REV CODE- 250/637: Performed by: EMERGENCY MEDICINE

## 2019-04-29 PROCEDURE — 94760 N-INVAS EAR/PLS OXIMETRY 1: CPT

## 2019-04-29 RX ORDER — FLUMAZENIL 0.1 MG/ML
0.2 INJECTION INTRAVENOUS
Status: DISCONTINUED | OUTPATIENT
Start: 2019-04-29 | End: 2019-04-29 | Stop reason: HOSPADM

## 2019-04-29 RX ORDER — PROPOFOL 10 MG/ML
INJECTION, EMULSION INTRAVENOUS AS NEEDED
Status: DISCONTINUED | OUTPATIENT
Start: 2019-04-29 | End: 2019-04-29 | Stop reason: HOSPADM

## 2019-04-29 RX ORDER — DEXTROMETHORPHAN/PSEUDOEPHED 2.5-7.5/.8
1.2 DROPS ORAL
Status: DISCONTINUED | OUTPATIENT
Start: 2019-04-29 | End: 2019-04-29 | Stop reason: HOSPADM

## 2019-04-29 RX ORDER — SODIUM CHLORIDE 0.9 % (FLUSH) 0.9 %
5-40 SYRINGE (ML) INJECTION EVERY 8 HOURS
Status: DISCONTINUED | OUTPATIENT
Start: 2019-04-29 | End: 2019-04-29 | Stop reason: HOSPADM

## 2019-04-29 RX ORDER — LIDOCAINE HYDROCHLORIDE 20 MG/ML
INJECTION, SOLUTION EPIDURAL; INFILTRATION; INTRACAUDAL; PERINEURAL AS NEEDED
Status: DISCONTINUED | OUTPATIENT
Start: 2019-04-29 | End: 2019-04-29 | Stop reason: HOSPADM

## 2019-04-29 RX ORDER — METOCLOPRAMIDE 5 MG/1
5 TABLET ORAL
Qty: 30 TAB | Refills: 0 | Status: SHIPPED | OUTPATIENT
Start: 2019-04-29 | End: 2019-05-09

## 2019-04-29 RX ORDER — ATROPINE SULFATE 0.1 MG/ML
0.5 INJECTION INTRAVENOUS
Status: DISCONTINUED | OUTPATIENT
Start: 2019-04-29 | End: 2019-04-29 | Stop reason: HOSPADM

## 2019-04-29 RX ORDER — EPINEPHRINE 0.1 MG/ML
1 INJECTION INTRACARDIAC; INTRAVENOUS
Status: DISCONTINUED | OUTPATIENT
Start: 2019-04-29 | End: 2019-04-29 | Stop reason: HOSPADM

## 2019-04-29 RX ORDER — NALOXONE HYDROCHLORIDE 0.4 MG/ML
0.4 INJECTION, SOLUTION INTRAMUSCULAR; INTRAVENOUS; SUBCUTANEOUS
Status: DISCONTINUED | OUTPATIENT
Start: 2019-04-29 | End: 2019-04-29 | Stop reason: HOSPADM

## 2019-04-29 RX ORDER — SODIUM CHLORIDE 9 MG/ML
25 INJECTION, SOLUTION INTRAVENOUS CONTINUOUS
Status: DISCONTINUED | OUTPATIENT
Start: 2019-04-29 | End: 2019-04-29

## 2019-04-29 RX ORDER — SODIUM CHLORIDE 0.9 % (FLUSH) 0.9 %
5-40 SYRINGE (ML) INJECTION AS NEEDED
Status: DISCONTINUED | OUTPATIENT
Start: 2019-04-29 | End: 2019-04-29 | Stop reason: HOSPADM

## 2019-04-29 RX ORDER — PANTOPRAZOLE SODIUM 40 MG/1
40 TABLET, DELAYED RELEASE ORAL DAILY
Qty: 30 TAB | Refills: 0 | Status: SHIPPED | OUTPATIENT
Start: 2019-04-29 | End: 2019-06-15

## 2019-04-29 RX ADMIN — OXYCODONE AND ACETAMINOPHEN 1 TABLET: 5; 325 TABLET ORAL at 02:14

## 2019-04-29 RX ADMIN — SODIUM CHLORIDE 25 ML/HR: 900 INJECTION, SOLUTION INTRAVENOUS at 15:23

## 2019-04-29 RX ADMIN — OXYCODONE AND ACETAMINOPHEN 1 TABLET: 5; 325 TABLET ORAL at 11:34

## 2019-04-29 RX ADMIN — GABAPENTIN 600 MG: 300 CAPSULE ORAL at 17:30

## 2019-04-29 RX ADMIN — OXYCODONE AND ACETAMINOPHEN 1 TABLET: 5; 325 TABLET ORAL at 17:31

## 2019-04-29 RX ADMIN — Medication 10 ML: at 17:33

## 2019-04-29 RX ADMIN — Medication 10 ML: at 08:28

## 2019-04-29 RX ADMIN — SODIUM CHLORIDE, SODIUM LACTATE, POTASSIUM CHLORIDE, AND CALCIUM CHLORIDE 125 ML/HR: 600; 310; 30; 20 INJECTION, SOLUTION INTRAVENOUS at 13:20

## 2019-04-29 RX ADMIN — PROCHLORPERAZINE EDISYLATE 5 MG: 5 INJECTION INTRAMUSCULAR; INTRAVENOUS at 14:44

## 2019-04-29 RX ADMIN — PROPOFOL 140 MG: 10 INJECTION, EMULSION INTRAVENOUS at 16:08

## 2019-04-29 RX ADMIN — OXYCODONE AND ACETAMINOPHEN 1 TABLET: 5; 325 TABLET ORAL at 07:37

## 2019-04-29 RX ADMIN — PROCHLORPERAZINE EDISYLATE 5 MG: 5 INJECTION INTRAMUSCULAR; INTRAVENOUS at 08:26

## 2019-04-29 RX ADMIN — FLUOXETINE 60 MG: 20 CAPSULE ORAL at 18:05

## 2019-04-29 RX ADMIN — SODIUM CHLORIDE, SODIUM LACTATE, POTASSIUM CHLORIDE, AND CALCIUM CHLORIDE 125 ML/HR: 600; 310; 30; 20 INJECTION, SOLUTION INTRAVENOUS at 05:59

## 2019-04-29 RX ADMIN — LIDOCAINE HYDROCHLORIDE 100 MG: 20 INJECTION, SOLUTION EPIDURAL; INFILTRATION; INTRACAUDAL; PERINEURAL at 15:56

## 2019-04-29 RX ADMIN — PROCHLORPERAZINE EDISYLATE 5 MG: 5 INJECTION INTRAMUSCULAR; INTRAVENOUS at 02:14

## 2019-04-29 NOTE — DISCHARGE SUMMARY
Hospitalist Discharge Summary     Patient ID:  Magdaleno Calderon  [id number]  89 y.o.  1955    PCP on record: Favian Klein MD    Admit date: 4/25/2019  Discharge date and time: 4/29/2019      DISCHARGE DIAGNOSIS:    Persistent epigastric pain and nausea, unclear etiology  Suspected pancreatitis with changes seen on CAT scan, but no elevation in lipase  Mild gastritis seen on EGD  Type 2 diabetes, controlled  Hypertension  Hyperlipidemia  Depression  History of multiple sclerosis  Tobacco use  Overweight status with a BMI 26      CONSULTATIONS:  IP CONSULT TO HOSPITALIST    Excerpted HPI from H&P of Jose Rasmussen MD:  CHIEF COMPLAINT:  Abdominal pain, diarrhea, nausea     HISTORY OF PRESENT ILLNESS:     Magdaleno Calderon is a 59 y.o.  female with DM type 2, hyperlipidemia, MS, depression presents with 8 days of diarrhea with dark stool about twice daily. Two days later had severe burning pain in epigastrium, sometimes radiate into RUQ feeling like someone has lit a blowtorch inside abdomen. Pain constant, resolve only with narcotic ie. Percocet, oxycodone given by ER. Pain worse with eating. Seen in ER 4/19 and CT showed minimal stranding in pancreatic head. Lipase normal at 228. Patient offered admission for pancreatitis but declined. Discharged on percocet and zofran and clear liquid diet. Returned to ER for persistent pain and nausea. Lipase was normal at 70. Repeat CT abdomen was normal and was discharged home on oxycodone. She has taken a few doses which helped with pain. Seen in GI office for follow up and was sent to ER due to pain. Lipase today normal at 59. Reports BS been running high at home.     We were asked to admit for work up and evaluation of the above problems.            ______________________________________________________________________  DISCHARGE SUMMARY/HOSPITAL COURSE:  for full details see H&P, daily progress notes, labs, consult notes. Epigastric pain, POA  Diarrhea with \"dark\" stool  Nausea  --suspect smoldering pancreatitis (had stranding at pancreatic head on CT 4/19) vs. Gastroenteritis or PUD. --repeat abdominal CT 4/21 was normal.  Lipase has been normal.  Hgb been normal/stable  -- IVF with LR, pain control with prn morphine, prn zofran.  cont clears, lipase remains normal, advance diet per gi  --GI consult appreciated and started IV protonix bid.  Hemoccult stool ordered  --check stool culture, fecal leukocyte, O&P. Cancel c diff, no further bms  --considering EGD if no improvement, will make NPO MN in case can be done in am  -H/h stable  -will try reglan for cont N  -decrease IV morphine and transition to percocet po for pain and wean pain meds if possible  -EGD today, mild gastritis, bx-p  -diet advanced, tolerated dinner, ok for dc and f/u with gi as outpt  -cont po ppi  -Consider further eval for gastroparesis as outpt        DM type 2  --hold metformin and tujeo while npo.  Moderate dose SSI.  -resume meds at dc     HTN  --hold lisinopril.  Pancreatitis has been associated with ACE-I  -may need other agent, f/u with pcp     Hyperlipidemia  --continue lipitor.    -trig 121     Depression  --continue prozac     Multiple sclerosis  --continue neurontin     Tobacco use  --advised smoke cessation.       Recommended Disposition:     Patient has been okayed for discharge per GI. Endoscopy showed only mild gastritis no other source. GI recommends continuing PPI. Biopsies were done she will need to follow-up in the office for results. Her lisinopril was discontinued given the findings of pancreatitis on CT, despite her atypical presentation. May need to look for other possibilities of pain if her symptoms persist including possibly gastroparesis evaluation. We will send her home with some PRN Reglan to try if she has recurrent symptoms.     She will need to follow-up with her primary care physician in 3 days and with gastroenterology next 1 to 2 weeks. _______________________________________________________________________  Patient seen and examined by me on discharge day. Pertinent Findings:    Patient had her endoscopy late this afternoon. Her diet was advanced she tolerated a GI light diet for dinner now eager to go home and sleep in bed. Patient is awake alert oriented x3 no distress. Non-ill-appearing. Cardiovascular regular rate lungs are clear abdomen bowel sounds are present soft currently nontender. Extremities no clubbing cyanosis or edema  _______________________________________________________________________  DISCHARGE MEDICATIONS:   Current Discharge Medication List      START taking these medications    Details   metoclopramide HCl (REGLAN) 5 mg tablet Take 1 Tab by mouth four (4) times daily as needed for Nausea for up to 10 days. Qty: 30 Tab, Refills: 0      pantoprazole (PROTONIX) 40 mg tablet Take 1 Tab by mouth daily. Qty: 30 Tab, Refills: 0         CONTINUE these medications which have NOT CHANGED    Details   atorvastatin (LIPITOR) 40 mg tablet Take 40 mg by mouth daily. insulin glargine U-300 conc (TOUJEO SOLOSTAR U-300 INSULIN) 300 unit/mL (1.5 mL) inpn 32 Units by SubCUTAneous route daily. oxyCODONE IR (ROXICODONE) 10 mg tab immediate release tablet Take 10 mg by mouth every six (6) hours as needed for Pain. FLUoxetine (PROZAC) 20 mg tablet Take 60 mg by mouth daily. metFORMIN (GLUCOPHAGE) 500 mg tablet Take 500 mg by mouth two (2) times daily (with meals). gabapentin (NEURONTIN) 400 mg capsule Take 600 mg by mouth two (2) times a day. clonazepam (KLONOPIN) 2 mg tablet Take 4 mg by mouth nightly.          STOP taking these medications       lisinopril (PRINIVIL, ZESTRIL) 5 mg tablet Comments:   Reason for Stopping:         oxyCODONE-acetaminophen (PERCOCET) 5-325 mg per tablet Comments:   Reason for Stopping:         azithromycin (ZITHROMAX) 250 mg tablet Comments:   Reason for Stopping:               My Recommended Diet, Activity, Wound Care, and follow-up labs are listed in the patient's Discharge Insturctions which I have personally completed and reviewed.     ______________________________________________________________________    Risk of deterioration: Moderate    Condition at Discharge:  Stable  ______________________________________________________________________    Disposition  Home with family, no needs  ______________________________________________________________________    Care Plan discussed with:   Patient, RN, Care Manager, Consultant    ______________________________________________________________________    Code Status: Full Code  ______________________________________________________________________      Follow up with:   PCP : Maximo Barone MD  Follow-up Information     Follow up With Specialties Details Why Contact Info    Maximo Barone MD [facility] In 3 days  [phone] [id number], Claudell Shaver, MD Gastroenterology In 1 week  [address]  [phone]                Total time in minutes spent coordinating this discharge (includes going over instructions, follow-up, prescriptions, and preparing report for sign off to her PCP) :  35 minutes    Signed:  Gina Pichardo MD

## 2019-04-29 NOTE — PROGRESS NOTES
Oncology End of Shift Note Bedside shift change report given to JIMBO Brooks (incoming nurse) by Elsie Faustin RN (outgoing nurse) on [facility]. Report included the following information SBAR. Shift Summary:  
Pt was NPO for EGD in early afternoon. Pt tolerated well, and diet was advanced. Pt did well with diet and is ready for home. Issues for Physician to Address:   
 
Patient on Cardiac Monitoring? [x] Yes 
[] No 
 
Rhythm: Telemetry: normal sinus rhythm and Sinus russell Elsie Faustin RN

## 2019-04-29 NOTE — PROGRESS NOTES
Medicare pt has received, reviewed, and signed 2nd IM letter informing them of their right to appeal the discharge. Signed copy has been placed on pt bedside chart. Seferino Francis [phone]

## 2019-04-29 NOTE — PROGRESS NOTES
Oncology End of Shift Note Bedside shift change report given to Jimmy Decker RN (incoming nurse) by Durga Gregg RN (outgoing nurse) on [facility]. Report included the following information SBAR, Kardex and MAR. Shift Summary: Pain meds and compazine x2, NPO since midnight Issues for Physician to Address:  Pt wants to know if she could have an ulcer? Patient on Cardiac Monitoring? [x] Yes 
[] No 
 
Rhythm: SB-SR Shift Events Durga Gregg RN

## 2019-04-29 NOTE — ANESTHESIA PREPROCEDURE EVALUATION
Anesthetic History No history of anesthetic complications Review of Systems / Medical History Patient summary reviewed, nursing notes reviewed and pertinent labs reviewed Pulmonary Smoker Asthma Comments: Current Every Day Smoker - 17.5 pack years Neuro/Psych Psychiatric history Comments: Multiple sclerosis Depression Anxiety Cardiovascular Within defined limits Exercise tolerance: >4 METS 
  
GI/Hepatic/Renal 
  
GERD Endo/Other Diabetes Other Findings Comments: Epigastric pain Chronic otitis externa of right ear Physical Exam 
 
Airway Mallampati: III 
TM Distance: 4 - 6 cm Neck ROM: normal range of motion Mouth opening: Normal 
 
 Cardiovascular Regular rate and rhythm,  S1 and S2 normal,  no murmur, click, rub, or gallop Dental 
No notable dental hx Pulmonary Breath sounds clear to auscultation Abdominal 
GI exam deferred Other Findings Anesthetic Plan ASA: 2 Anesthesia type: total IV anesthesia and MAC Induction: Intravenous Anesthetic plan and risks discussed with: Patient

## 2019-04-29 NOTE — PROGRESS NOTES
I have reviewed discharge instructions with the patient and spouse. The patient and spouse verbalized understanding. Discharge medications reviewed with patient and spouse and appropriate educational materials and side effects teaching were provided. Follow-up appointments reviewed with patient. Opportunity for questions or concerns given. Venous access removed without difficulty, pt tolerated well. Patients belongings gathered and sent with patient. Patient is ready for discharge. Pt left w/ AVS, prescriptions.

## 2019-04-29 NOTE — ROUTINE PROCESS
Lattie Mix 1955 
[id number] Situation: 
Verbal report received from: Finn Berry Procedure: Procedure(s): ESOPHAGOGASTRODUODENOSCOPY (EGD) ESOPHAGOGASTRODUODENAL (EGD) BIOPSY Background: 
 
Preoperative diagnosis: Epigastric pain [R10.13] Postoperative diagnosis: gastritis :  Dr. Beni Bro Assistant(s): Endoscopy RN-1: Aminata Wilkerson Endoscopy RN-2: Bentley Ocampo Specimens:  
ID Type Source Tests Collected by Time Destination 1 : gastric biopsy for pathology Preservative Gastric  Vashti Schreiber MD 4/29/2019 9998 Pathology H. Pylori  no Assessment: 
Intra-procedure medications Anesthesia gave intra-procedure sedation and medications, see anesthesia flow sheet yes Intravenous fluids: NS@ Meliton Sam Vital signs stable Abdominal assessment: round and soft Recommendation: 
Discharge patient per MD order Theresa Mendoza Return to floor Family or Friend   - Henry herman Permission to share finding with family or friend yes

## 2019-04-29 NOTE — ROUTINE PROCESS
TRANSFER - IN REPORT: 
 
Verbal report received from Yelena Guillen RN on Janis Roque  being received from [phone] for ordered procedure Report consisted of patients Situation, Background, Assessment and  
Recommendations. Information from the following report SBAR was reviewed with the receiving nurse. Opportunity for questions and clarification was provided. Assessment completed upon patients arrival to unit and care assumed.

## 2019-04-29 NOTE — ANESTHESIA POSTPROCEDURE EVALUATION
Procedure(s): ESOPHAGOGASTRODUODENOSCOPY (EGD) ESOPHAGOGASTRODUODENAL (EGD) BIOPSY. total IV anesthesia Anesthesia Post Evaluation Patient location during evaluation: PACU Note status: Adequate. Level of consciousness: responsive to verbal stimuli and sleepy but conscious Pain management: satisfactory to patient Airway patency: patent Anesthetic complications: no 
Cardiovascular status: acceptable Respiratory status: acceptable Hydration status: acceptable Comments: +Post-Anesthesia Evaluation and Assessment Patient: Yolande Acosta MRN: [id number]  SSN: [id number] YOB: 1955  Age: 59 y.o. Sex: female Cardiovascular Function/Vital Signs /74   Pulse (!) 58   Temp 36.9 °C (98.5 °F)   Resp 21   Ht 5' 9\" (1.753 m)   Wt 81.3 kg (179 lb 3.7 oz)   SpO2 97%   BMI 26.47 kg/m² Patient is status post Procedure(s): ESOPHAGOGASTRODUODENOSCOPY (EGD) ESOPHAGOGASTRODUODENAL (EGD) BIOPSY. Nausea/Vomiting: Controlled. Postoperative hydration reviewed and adequate. Pain: 
Pain Scale 1: Numeric (0 - 10) (04/29/19 1623) Pain Intensity 1: 5 (04/29/19 1623) Managed. Neurological Status: At baseline. Mental Status and Level of Consciousness: Arousable. Pulmonary Status:  
O2 Device: Room air (04/29/19 1627) Adequate oxygenation and airway patent. Complications related to anesthesia: None Post-anesthesia assessment completed. No concerns. Signed By: Donavan Pickett DO  
 4/29/2019 Post anesthesia nausea and vomiting:  controlled Vitals Value Taken Time /74 4/29/2019  4:27 PM  
Temp 36.9 °C (98.5 °F) 4/29/2019  4:23 PM  
Pulse 58 4/29/2019  4:27 PM  
Resp 21 4/29/2019  4:27 PM  
SpO2 97 % 4/29/2019  4:27 PM

## 2019-04-29 NOTE — DISCHARGE INSTRUCTIONS
HOSPITALIST DISCHARGE INSTRUCTIONS    NAME: Elena Rose   :  1955   MRN:  [id number]     Date/Time:  2019 6:52 PM    ADMIT DATE: 2019   DISCHARGE DATE: 2019         · It is important that you take the medication exactly as they are prescribed. · Keep your medication in the bottles provided by the pharmacist and keep a list of the medication names, dosages, and times to be taken in your wallet. · Do not take other medications without consulting your doctor. What to do at [address]:  advance as tolerated to low fat diabetic diet    Recommended activity: Activity as tolerated      If you have questions regarding the hospital related prescriptions or hospital related issues please call [facility] at [phone]. You can always direct your questions to your primary care doctor if you are unable to reach your hospital physician; your PCP works as an extension of your hospital doctor just like your hospital doctor is an extension of your PCP for your time at the hospital [facility], [facility])    If you experience any of the following symptoms then please call your primary care physician or return to the emergency room if you cannot get hold of your doctor:    Fever, chills, nausea, vomiting, or persistent diarrhea  Worsening weakness or new problems with your speech or balance  Dark stools or visible blood in your stools  New Leg swelling or shortness of breath as these could be signs of a clot    Additional Instructions:      Bring these papers with you to your follow up appointments. The papers will help your doctors be sure to continue the care plan from the hospital.      F/u with pcp in 3 days  F/u with gi in 1 week  Call md if persistent pain, nausea or vomiting        Information obtained by :  I understand that if any problems occur once I am at home I am to contact my physician.     I understand and acknowledge receipt of the instructions indicated above.                                                                                                                                           Physician's or R.N.'s Signature                                                                  Date/Time                                                                                                                                              Patient or Representative Signature

## 2019-04-29 NOTE — PROGRESS NOTES
Oncology Interdisciplinary rounds were held today to discuss patient plan of care and outcomes. The following members were present: Nursing, Physician, Case Management, Pharmacy, and PT/OT Actual Length of Stay: 3 Expected Length of Stay: - - - Plan            Discharge Pending GI recommendations Home

## 2019-04-29 NOTE — PROGRESS NOTES
Problem: Pain Goal: *Control of Pain Outcome: Progressing Towards Goal 
Goal: *PALLIATIVE CARE:  Alleviation of Pain Outcome: Progressing Towards Goal 
 Pt using numeric pain scale to keep pain within manageable limits with PRN medications.

## 2019-04-29 NOTE — PERIOP NOTES
TRANSFER - OUT REPORT: 
 
Verbal report given to Wai Rodriguez RN on Elena Rose  being transferred to [address] [place] [address] for routine post - op Report consisted of patients Situation, Background, Assessment and  
Recommendations. Information from the following reports Procedure Summary, Intake/Output and MAR was reviewed with the receiving nurse. Lines:  
Peripheral IV 04/28/19 Left Antecubital (Active) Site Assessment Clean, dry, & intact 4/29/2019  3:13 AM  
Phlebitis Assessment 0 4/29/2019  3:13 AM  
Infiltration Assessment 0 4/29/2019  3:13 AM  
Dressing Status Clean, dry, & intact 4/29/2019  3:13 AM  
Dressing Type Transparent;Tape 4/29/2019  3:13 AM  
Hub Color/Line Status Blue; Infusing 4/29/2019  3:13 AM  
Alcohol Cap Used Yes 4/28/2019  1:13 PM  
  
 
Opportunity for questions and clarification was provided.

## 2019-04-29 NOTE — H&P
Date of Surgery Update: 
Tracy Mac was seen and examined. History and physical has been reviewed. The patient has been examined.  There have been no significant clinical changes since the completion of the originally dated History and Physical. 
 
Signed By: Saurabh Rodríguez MD   
 April 29, 2019 3:54 PM

## 2019-04-29 NOTE — PROCEDURES
NAME:  Dominic Raymond   :   1955   MRN:   [id number]     Date/Time:  2019 3:57 PM    Esophagogastroduodenoscopy (EGD) Procedure Note    Procedure: Esophagogastroduodenoscopy with biopsy    Indication: epigastric pain  Pre-operative Diagnosis: see indication above  Post-operative Diagnosis: see findings below  :  Kadeem Dillon MD  Referring Provider:   -Perez Tanner MD    Exam:  Airway: clear, no airway problems anticipated  Heart: RRR, without gallops or rubs  Lungs: clear bilaterally without wheezes, crackles, or rhonchi  Abdomen: soft, nontender, nondistended, bowel sounds present  Mental Status: awake, alert and oriented to person, place and time     Anethesia/Sedation:  MAC anesthesia Propofol  Procedure Details   After informed consent was obtained for the procedure, with all risks and benefits of procedure explained the patient was taken to the endoscopy suite and placed in the left lateral decubitus position. Following sequential administration of sedation as per above, the [id number] gastroscope was inserted into the mouth and advanced under direct vision to third portion of the duodenum. A careful inspection was made as the gastroscope was withdrawn, including a retroflexed view of the proximal stomach; findings and interventions are described below. Findings:   OROPHARYNX: Cords and pyriform recesses normal.   ESOPHAGUS: The esophagus is normal. The proximal, mid, and distal portions are normal. The Z-Line is intact. STOMACH: The fundus on antegrade and retroflex views is normal. The body, antrum, and pylorus are mildly erythematous, otherwise normal. A few biopsies obtained. DUODENUM: The bulb, second and third portions are normal.    Therapies:   biopsy of stomach body, antrum    Specimens: gastric    EBL:  None. Complications:   None; patient tolerated the procedure well.            Impression:  -- mild gastritis, which can certainly cause some discomfort, but not to the degree she reports  -- otherwise, normal study  -- no clear GI source of reported symptoms on labs, imaging, or this EGD.     Recommendations:  -- advance diet as tolerated  -- consider vascular or MSK etiology for pain    Discharge disposition:  Back to wards    Radha Ramos MD

## 2019-06-15 ENCOUNTER — HOSPITAL ENCOUNTER (EMERGENCY)
Age: 64
Discharge: HOME | End: 2019-06-15
Attending: EMERGENCY MEDICINE | Admitting: EMERGENCY MEDICINE
Payer: MEDICARE

## 2019-06-15 ENCOUNTER — APPOINTMENT (OUTPATIENT)
Dept: CT IMAGING | Age: 64
End: 2019-06-15
Attending: EMERGENCY MEDICINE
Payer: MEDICARE

## 2019-06-15 VITALS
RESPIRATION RATE: 18 BRPM | WEIGHT: 174.38 LBS | SYSTOLIC BLOOD PRESSURE: 114 MMHG | TEMPERATURE: 98.2 F | HEART RATE: 59 BPM | OXYGEN SATURATION: 96 % | BODY MASS INDEX: 25.75 KG/M2 | DIASTOLIC BLOOD PRESSURE: 77 MMHG

## 2019-06-15 DIAGNOSIS — K52.9 GASTROENTERITIS, ACUTE: ICD-10-CM

## 2019-06-15 DIAGNOSIS — R10.84 ABDOMINAL PAIN, GENERALIZED: Primary | ICD-10-CM

## 2019-06-15 LAB
ALBUMIN SERPL-MCNC: 4.2 G/DL (ref 3.5–5)
ALBUMIN/GLOB SERPL: 0.9 {RATIO} (ref 1.1–2.2)
ALP SERPL-CCNC: 85 U/L (ref 45–117)
ALT SERPL-CCNC: 31 U/L (ref 12–78)
ANION GAP SERPL CALC-SCNC: 8 MMOL/L (ref 5–15)
APPEARANCE UR: CLEAR
AST SERPL-CCNC: 28 U/L (ref 15–37)
BACTERIA URNS QL MICRO: NEGATIVE /HPF
BASOPHILS # BLD: 0 K/UL (ref 0–0.1)
BASOPHILS NFR BLD: 0 % (ref 0–1)
BILIRUB SERPL-MCNC: 0.5 MG/DL (ref 0.2–1)
BILIRUB UR QL: NEGATIVE
BUN SERPL-MCNC: 12 MG/DL (ref 6–20)
BUN/CREAT SERPL: 13 (ref 12–20)
CALCIUM SERPL-MCNC: 9.6 MG/DL (ref 8.5–10.1)
CHLORIDE SERPL-SCNC: 103 MMOL/L (ref 97–108)
CO2 SERPL-SCNC: 24 MMOL/L (ref 21–32)
COLOR UR: NORMAL
CREAT SERPL-MCNC: 0.93 MG/DL (ref 0.55–1.02)
DIFFERENTIAL METHOD BLD: ABNORMAL
EOSINOPHIL # BLD: 0.1 K/UL (ref 0–0.4)
EOSINOPHIL NFR BLD: 2 % (ref 0–7)
EPITH CASTS URNS QL MICRO: NORMAL /LPF
ERYTHROCYTE [DISTWIDTH] IN BLOOD BY AUTOMATED COUNT: 13.9 % (ref 11.5–14.5)
GLOBULIN SER CALC-MCNC: 4.5 G/DL (ref 2–4)
GLUCOSE SERPL-MCNC: 147 MG/DL (ref 65–100)
GLUCOSE UR STRIP.AUTO-MCNC: NEGATIVE MG/DL
HCT VFR BLD AUTO: 45.7 % (ref 35–47)
HGB BLD-MCNC: 14.8 G/DL (ref 11.5–16)
HGB UR QL STRIP: NEGATIVE
HYALINE CASTS URNS QL MICRO: NORMAL /LPF (ref 0–5)
IMM GRANULOCYTES # BLD AUTO: 0 K/UL (ref 0–0.04)
IMM GRANULOCYTES NFR BLD AUTO: 0 % (ref 0–0.5)
KETONES UR QL STRIP.AUTO: NEGATIVE MG/DL
LEUKOCYTE ESTERASE UR QL STRIP.AUTO: NEGATIVE
LIPASE SERPL-CCNC: 64 U/L (ref 73–393)
LYMPHOCYTES # BLD: 2.2 K/UL (ref 0.8–3.5)
LYMPHOCYTES NFR BLD: 35 % (ref 12–49)
MCH RBC QN AUTO: 27.4 PG (ref 26–34)
MCHC RBC AUTO-ENTMCNC: 32.4 G/DL (ref 30–36.5)
MCV RBC AUTO: 84.6 FL (ref 80–99)
MONOCYTES # BLD: 0.4 K/UL (ref 0–1)
MONOCYTES NFR BLD: 7 % (ref 5–13)
NEUTS SEG # BLD: 3.4 K/UL (ref 1.8–8)
NEUTS SEG NFR BLD: 56 % (ref 32–75)
NITRITE UR QL STRIP.AUTO: NEGATIVE
NRBC # BLD: 0 K/UL (ref 0–0.01)
NRBC BLD-RTO: 0 PER 100 WBC
PH UR STRIP: 6.5 [PH] (ref 5–8)
PLATELET # BLD AUTO: 320 K/UL (ref 150–400)
PMV BLD AUTO: 8.8 FL (ref 8.9–12.9)
POTASSIUM SERPL-SCNC: 3.9 MMOL/L (ref 3.5–5.1)
PROT SERPL-MCNC: 8.7 G/DL (ref 6.4–8.2)
PROT UR STRIP-MCNC: NEGATIVE MG/DL
RBC # BLD AUTO: 5.4 M/UL (ref 3.8–5.2)
RBC #/AREA URNS HPF: NORMAL /HPF (ref 0–5)
SODIUM SERPL-SCNC: 135 MMOL/L (ref 136–145)
SP GR UR REFRACTOMETRY: 1.02 (ref 1–1.03)
UA: UC IF INDICATED,UAUC: NORMAL
UROBILINOGEN UR QL STRIP.AUTO: 0.2 EU/DL (ref 0.2–1)
WBC # BLD AUTO: 6.2 K/UL (ref 3.6–11)
WBC URNS QL MICRO: NORMAL /HPF (ref 0–4)

## 2019-06-15 PROCEDURE — 36415 COLL VENOUS BLD VENIPUNCTURE: CPT

## 2019-06-15 PROCEDURE — 96375 TX/PRO/DX INJ NEW DRUG ADDON: CPT

## 2019-06-15 PROCEDURE — 81001 URINALYSIS AUTO W/SCOPE: CPT

## 2019-06-15 PROCEDURE — 74011636320 HC RX REV CODE- 636/320: Performed by: EMERGENCY MEDICINE

## 2019-06-15 PROCEDURE — 74177 CT ABD & PELVIS W/CONTRAST: CPT

## 2019-06-15 PROCEDURE — 80053 COMPREHEN METABOLIC PANEL: CPT

## 2019-06-15 PROCEDURE — 85025 COMPLETE CBC W/AUTO DIFF WBC: CPT

## 2019-06-15 PROCEDURE — 83690 ASSAY OF LIPASE: CPT

## 2019-06-15 PROCEDURE — 99284 EMERGENCY DEPT VISIT MOD MDM: CPT

## 2019-06-15 PROCEDURE — 74011250636 HC RX REV CODE- 250/636: Performed by: EMERGENCY MEDICINE

## 2019-06-15 PROCEDURE — 96374 THER/PROPH/DIAG INJ IV PUSH: CPT

## 2019-06-15 RX ORDER — DICYCLOMINE HYDROCHLORIDE 10 MG/1
10 CAPSULE ORAL 4 TIMES DAILY
Qty: 20 CAP | Refills: 0 | Status: SHIPPED | OUTPATIENT
Start: 2019-06-15 | End: 2019-06-20

## 2019-06-15 RX ORDER — MORPHINE SULFATE 4 MG/ML
4 INJECTION INTRAVENOUS
Status: COMPLETED | OUTPATIENT
Start: 2019-06-15 | End: 2019-06-15

## 2019-06-15 RX ORDER — LISINOPRIL 10 MG/1
20 TABLET ORAL DAILY
COMMUNITY

## 2019-06-15 RX ORDER — SUCRALFATE 1 G/1
1 TABLET ORAL 4 TIMES DAILY
Qty: 30 TAB | Refills: 0 | Status: SHIPPED | OUTPATIENT
Start: 2019-06-15 | End: 2021-04-27

## 2019-06-15 RX ORDER — ONDANSETRON 2 MG/ML
4 INJECTION INTRAMUSCULAR; INTRAVENOUS
Status: COMPLETED | OUTPATIENT
Start: 2019-06-15 | End: 2019-06-15

## 2019-06-15 RX ORDER — CALCIUM GLUCONATE 94 MG/ML
1 INJECTION, SOLUTION INTRAVENOUS
Status: DISCONTINUED | OUTPATIENT
Start: 2019-06-15 | End: 2019-06-15

## 2019-06-15 RX ORDER — SODIUM CHLORIDE 0.9 % (FLUSH) 0.9 %
10 SYRINGE (ML) INJECTION
Status: COMPLETED | OUTPATIENT
Start: 2019-06-15 | End: 2019-06-15

## 2019-06-15 RX ORDER — FENTANYL CITRATE 50 UG/ML
50 INJECTION, SOLUTION INTRAMUSCULAR; INTRAVENOUS
Status: COMPLETED | OUTPATIENT
Start: 2019-06-15 | End: 2019-06-15

## 2019-06-15 RX ORDER — ONDANSETRON 4 MG/1
4 TABLET, FILM COATED ORAL
Qty: 20 TAB | Refills: 0 | Status: SHIPPED | OUTPATIENT
Start: 2019-06-15 | End: 2021-04-27

## 2019-06-15 RX ORDER — SODIUM BICARBONATE 1 MEQ/ML
50 SYRINGE (ML) INTRAVENOUS
Status: DISCONTINUED | OUTPATIENT
Start: 2019-06-15 | End: 2019-06-15

## 2019-06-15 RX ORDER — KETOROLAC TROMETHAMINE 30 MG/ML
15 INJECTION, SOLUTION INTRAMUSCULAR; INTRAVENOUS
Status: COMPLETED | OUTPATIENT
Start: 2019-06-15 | End: 2019-06-15

## 2019-06-15 RX ORDER — PANTOPRAZOLE SODIUM 40 MG/1
40 TABLET, DELAYED RELEASE ORAL DAILY
Qty: 20 TAB | Refills: 0 | Status: SHIPPED | OUTPATIENT
Start: 2019-06-15 | End: 2019-07-05

## 2019-06-15 RX ORDER — DEXTROSE MONOHYDRATE 100 MG/ML
125-250 INJECTION, SOLUTION INTRAVENOUS AS NEEDED
Status: DISCONTINUED | OUTPATIENT
Start: 2019-06-15 | End: 2019-06-15

## 2019-06-15 RX ADMIN — Medication 10 ML: at 08:11

## 2019-06-15 RX ADMIN — ONDANSETRON 4 MG: 2 INJECTION INTRAMUSCULAR; INTRAVENOUS at 06:48

## 2019-06-15 RX ADMIN — FENTANYL CITRATE 50 MCG: 50 INJECTION, SOLUTION INTRAMUSCULAR; INTRAVENOUS at 06:39

## 2019-06-15 RX ADMIN — IOPAMIDOL 100 ML: 755 INJECTION, SOLUTION INTRAVENOUS at 08:21

## 2019-06-15 RX ADMIN — KETOROLAC TROMETHAMINE 15 MG: 30 INJECTION, SOLUTION INTRAMUSCULAR at 06:39

## 2019-06-15 RX ADMIN — MORPHINE SULFATE 4 MG: 4 INJECTION INTRAVENOUS at 08:11

## 2019-06-15 NOTE — ED NOTES
Bedside shift change report given to Cecilia [place] (oncoming nurse) by Jania Shipley RN (offgoing nurse). Report included the following information SBAR and ED Summary.

## 2019-06-15 NOTE — ED PROVIDER NOTES
EMERGENCY DEPARTMENT HISTORY AND PHYSICAL EXAM      Date: 6/15/2019  Patient Name: Brynn Bartlett  Patient Age and Sex: 59 y.o. female     History of Presenting Illness     Chief Complaint   Patient presents with    Abdominal Pain     Middle/diffused abdominal pain x3 days. Patient states she was admitted in April for pancreatitis, and is having similar symptoms. Accompanied nausea. Last PO intake x2 days ago       History Provided By: Patient    HPI: Brynn Bartlett is a 71-year-old female with a history of gastritis, pancreatitis presenting for abdominal pain. Patient states that for the past 3 days has been having generalized abdominal pain worse in the periumbilical region. It is associated with nausea but no vomiting. States that she has not been eating secondary to the pain and has also been constipated. States that the last time she had a bowel movement that was normal was 3 days ago. States that yesterday she did have a smell foul-smelling small movement. Patient states that her urination is also been less. Denies any fevers, vomiting, flank pain. There are no other complaints, changes, or physical findings at this time. PCP: Romel Lopez MD    No current facility-administered medications on file prior to encounter. Current Outpatient Medications on File Prior to Encounter   Medication Sig Dispense Refill    lisinopril (PRINIVIL, ZESTRIL) 10 mg tablet Take 20 mg by mouth daily.  atorvastatin (LIPITOR) 40 mg tablet Take 40 mg by mouth daily.  insulin glargine U-300 conc (TOUJEO SOLOSTAR U-300 INSULIN) 300 unit/mL (1.5 mL) inpn 32 Units by SubCUTAneous route daily.  FLUoxetine (PROZAC) 20 mg tablet Take 60 mg by mouth daily.  metFORMIN (GLUCOPHAGE) 500 mg tablet Take 500 mg by mouth two (2) times daily (with meals).  gabapentin (NEURONTIN) 400 mg capsule Take 600 mg by mouth two (2) times a day.  clonazepam (KLONOPIN) 2 mg tablet Take 4 mg by mouth nightly. Past History     Past Medical History:  Past Medical History:   Diagnosis Date    Cancer ([facility] [address]     SKIN-LABIA    Chronic pain     Depression with anxiety     Diabetes (HCC)     GERD (gastroesophageal reflux disease)     Labial lesion     precancerous, resected    Multiple sclerosis (HCC)     Psychiatric disorder     DEPRESSION/ ANXIETY    Tobacco abuse        Past Surgical History:  Past Surgical History:   Procedure Laterality Date    HX APPENDECTOMY      HX  SECTION      HX CHOLECYSTECTOMY      HX HERNIA REPAIR      HX HYSTERECTOMY      UPPER GI ENDOSCOPY,BIOPSY  2019            Family History:  Family History   Problem Relation Age of Onset    Heart Disease Father     Diabetes Mother     Parkinsonism Mother     COPD Mother     Breast Cancer Paternal Grandmother         over 48    Anesth Problems Neg Hx        Social History:  Social History     Tobacco Use    Smoking status: Current Every Day Smoker     Packs/day: 0.50     Years: 35.00     Pack years: 17.50     Types: Cigarettes    Smokeless tobacco: Never Used   Substance Use Topics    Alcohol use: No    Drug use: No       Allergies: Allergies   Allergen Reactions    Pcn [Penicillins] Rash         Review of Systems   Review of Systems   Constitutional: Positive for appetite change and fatigue. Negative for chills and fever. Respiratory: Negative for cough and shortness of breath. Cardiovascular: Negative for chest pain. Gastrointestinal: Positive for abdominal pain and nausea. Negative for constipation, diarrhea and vomiting. Genitourinary: Positive for difficulty urinating. Negative for dysuria. Neurological: Negative for weakness and numbness. All other systems reviewed and are negative. Physical Exam   Physical Exam   Constitutional: She is oriented to person, place, and time. She appears well-developed and well-nourished. She appears distressed. HENT:   Head: Normocephalic and atraumatic. Eyes: Conjunctivae and EOM are normal.   Neck: Normal range of motion. Neck supple. Cardiovascular: Normal rate and regular rhythm. Pulmonary/Chest: Effort normal and breath sounds normal. No respiratory distress. Abdominal: Soft. She exhibits no distension. There is tenderness. Patient is tender diffusely but worse in the epigastrium, periumbilical region, suprapubic region. Musculoskeletal: Normal range of motion. Neurological: She is alert and oriented to person, place, and time. Skin: Skin is warm and dry. Psychiatric: She has a normal mood and affect. Nursing note and vitals reviewed. Diagnostic Study Results     Labs -   No results found for this or any previous visit (from the past 12 hour(s)). Radiologic Studies -   CT ABD PELV W CONT   Final Result   IMPRESSION: No Acute Disease. No change. CT Results  (Last 48 hours)               06/15/19 0821  CT ABD PELV W CONT Final result    Impression:  IMPRESSION: No Acute Disease. No change. Narrative:  INDICATION: Abdominal pain; diffuse abd pain with hx of pancreatitis        EXAM: CT Abdomen and Pelvis is performed with 100 mL Isovue 370 contrast IV   without oral contrast. CT dose reduction was achieved through use of a   standardized protocol tailored for this examination and automatic exposure   control for dose modulation. COMPARISON: 4/21/2019. FINDINGS:    There is no inflammation, ascites, pneumoperitoneum or significant adenopathy. Liver shows no significant finding. Gallbladder is absent. Bile ducts are not   enlarged. Pancreas shows no mass or inflammation. Spleen is unremarkable. Adrenal glands   are normal in size. Kidneys show no mass or hydronephrosis. Aorta is without   aneurysm. The appendix is absent. Bowels are unremarkable. The bladder is not distended. The distal ureters are not dilated. There is no apparent pelvic mass.                CXR Results  (Last 48 hours) None            Medical Decision Making   I am the first provider for this patient. I reviewed the vital signs, available nursing notes, past medical history, past surgical history, family history and social history. Vital Signs-Reviewed the patient's vital signs. No data found. Records Reviewed: Nursing Notes and Old Medical Records    Provider Notes (Medical Decision Making):   Patient presents with abdominal pain. DDx: pancreatitis, gastritis, Gastroenteritis, SBO, appendicitis, colitis, IBD, diverticulitis, mesenteric ischemia, AAA or descending dissection, ACS, ureteral stone. Will get labs and CT Abdomen. ED Course:   Initial assessment performed. The patients presenting problems have been discussed, and they are in agreement with the care plan formulated and outlined with them. I have encouraged them to ask questions as they arise throughout their visit. Disposition:  Discharge Note:  The patient has been re-evaluated and is ready for discharge. Reviewed available results with patient. Counseled patient on diagnosis and care plan. Patient has expressed understanding, and all questions have been answered. Patient agrees with plan and agrees to follow up as recommended, or to return to the ED if their symptoms worsen. Discharge instructions have been provided and explained to the patient, along with reasons to return to the ED. Diagnosis     Clinical Impression:   1. Abdominal pain, generalized    2. Gastroenteritis, acute        Attestations:  Audelia Tidwell M.D. Please note that this dictation was completed with [employer], the computer voice recognition software. Quite often unanticipated grammatical, syntax, homophones, and other interpretive errors are inadvertently transcribed by the computer software. Please disregard these errors. Please excuse any errors that have escaped final proofreading. Thank you.

## 2020-04-11 NOTE — DISCHARGE INSTRUCTIONS
Patient Education        Learning About Acute Pancreatitis  What is acute pancreatitis? The pancreas is an organ behind the stomach. It makes hormones like insulin that help control how your body uses sugar. It also makes enzymes that help you digest food. Usually these enzymes flow from the pancreas to the intestines. But if they leak into the pancreas, they can irritate it and cause pain and swelling. When this happens suddenly, it's called acute pancreatitis. What causes it? Most of the time, acute pancreatitis is caused by gallstones or by heavy alcohol use. But several other things can cause it, such as:  · High levels of fats in the blood. · An injury. · A problem after a surgery or a procedure. · Certain medicines. What are the symptoms? The main symptom is pain in the upper belly. The pain can be severe. You also may have a fever, nausea, or vomiting. Some people get so sick that they have problems breathing. They also may have low blood pressure. How is it diagnosed? Your doctor will diagnose pancreatitis with an exam and by looking at your lab tests. Your doctor may think that you have this problem based on your symptoms and where you have pain in your belly. You may have blood tests of enzymes called amylase and lipase. In pancreatitis, the level of these enzymes is usually much higher than normal.  You also may have imaging tests of the belly. These may include an ultrasound, a CT scan, or an MRI. A special MRI called MRCP can show images of the bile ducts. This test can be very helpful when gallstones are causing the problem. How is it treated? Treatment of acute pancreatitis usually takes place in the hospital. It focuses on taking care of pain and supporting your body while your pancreas heals. In severe cases, treatment may occur in an intensive care unit to support breathing, treat serious infections, or help raise very low blood pressure.   If a gallstone is causing the problem, the gallstone may need to be removed. This is done during a procedure called ERCP. The doctor puts a scope in your mouth and moves it gently through the stomach and into the ducts from the pancreas and gallbladder. The doctor is then able to see a stone and remove it. Sometimes the gallbladder, which makes gallstones, needs to be removed by surgery. People with pancreatitis often need a lot of fluid to help support their other organs and their blood pressure. They get fluids through a vein (intravenous, or IV). Instead of food by mouth, nutrition is sometimes given through a vein while the pancreas heals. Follow-up care is a key part of your treatment and safety. Be sure to make and go to all appointments, and call your doctor if you are having problems. It's also a good idea to know your test results and keep a list of the medicines you take. Where can you learn more? Go to http://chidi-ellen.info/. Enter U826 in the search box to learn more about \"Learning About Acute Pancreatitis. \"  Current as of: March 27, 2018  Content Version: 11.9  © 8503-0762 [employer] Care instructions adapted under license by [employer] (which disclaims liability or warranty for this information). If you have questions about a medical condition or this instruction, always ask your healthcare professional. [address] any warranty or liability for your use of this information. We hope that we have addressed all of your medical concerns. The examination and treatment you received in the Emergency Department were for an emergent problem and were not intended as complete care. It is important that you follow up with your healthcare provider(s) for ongoing care. If your symptoms worsen or do not improve as expected, and you are unable to reach your usual health care provider(s), you should return to the Emergency Department.       [facility] participate in nationally recognized quality of care measures. If your blood pressure is greater than 120/80, as reported below, we urge that you seek medical care to address the potential of high blood pressure, commonly known as hypertension. Hypertension can be hereditary or can be caused by certain medical conditions, pain, stress, or \"white coat syndrome. \"       Please make an appointment with your health care provider(s) for follow up of your Emergency Department visit. VITALS:   Patient Vitals for the past 8 hrs:   Temp Pulse Resp BP SpO2   04/19/19 1955 97.9 °F (36.6 °C) 65 18 (!) 138/98 97 %   04/19/19 1933 97.8 °F (36.6 °C) 91 16 (!) 180/99 99 %          Thank you for allowing us to provide you with medical care today. We realize that you have many choices for your emergency care needs. Please choose us in the future for any continued health care needs. Sheila Lepe NP              Recent Results (from the past 24 hour(s))   CBC WITH AUTOMATED DIFF    Collection Time: 04/19/19  8:10 PM   Result Value Ref Range    WBC 7.6 3.6 - 11.0 K/uL    RBC 4.53 3.80 - 5.20 M/uL    HGB 12.5 11.5 - 16.0 g/dL    HCT 38.3 35.0 - 47.0 %    MCV 84.5 80.0 - 99.0 FL    MCH 27.6 26.0 - 34.0 PG    MCHC 32.6 30.0 - 36.5 g/dL    RDW 13.4 11.5 - 14.5 %    PLATELET 573 812 - 906 K/uL    MPV 8.7 (L) 8.9 - 12.9 FL    NRBC 0.0 0  WBC    ABSOLUTE NRBC 0.00 0.00 - 0.01 K/uL    NEUTROPHILS 55 32 - 75 %    LYMPHOCYTES 38 12 - 49 %    MONOCYTES 6 5 - 13 %    EOSINOPHILS 1 0 - 7 %    BASOPHILS 0 0 - 1 %    IMMATURE GRANULOCYTES 0 0.0 - 0.5 %    ABS. NEUTROPHILS 4.1 1.8 - 8.0 K/UL    ABS. LYMPHOCYTES 2.9 0.8 - 3.5 K/UL    ABS. MONOCYTES 0.5 0.0 - 1.0 K/UL    ABS. EOSINOPHILS 0.1 0.0 - 0.4 K/UL    ABS. BASOPHILS 0.0 0.0 - 0.1 K/UL    ABS. IMM.  GRANS. 0.0 0.00 - 0.04 K/UL    DF AUTOMATED     METABOLIC PANEL, COMPREHENSIVE    Collection Time: 04/19/19  8:10 PM   Result Value Ref Range    Sodium 138 136 - 145 mmol/L    Potassium 4.0 3.5 - 5.1 mmol/L    Chloride 103 97 - 108 mmol/L    CO2 27 21 - 32 mmol/L    Anion gap 8 5 - 15 mmol/L    Glucose 151 (H) 65 - 100 mg/dL    BUN 12 6 - 20 MG/DL    Creatinine 0.67 0.55 - 1.02 MG/DL    BUN/Creatinine ratio 18 12 - 20      GFR est AA >60 >60 ml/min/1.73m2    GFR est non-AA >60 >60 ml/min/1.73m2    Calcium 10.2 (H) 8.5 - 10.1 MG/DL    Bilirubin, total 0.3 0.2 - 1.0 MG/DL    ALT (SGPT) 21 12 - 78 U/L    AST (SGOT) 13 (L) 15 - 37 U/L    Alk. phosphatase 89 45 - 117 U/L    Protein, total 7.7 6.4 - 8.2 g/dL    Albumin 3.7 3.5 - 5.0 g/dL    Globulin 4.0 2.0 - 4.0 g/dL    A-G Ratio 0.9 (L) 1.1 - 2.2     LIPASE    Collection Time: 04/19/19  8:10 PM   Result Value Ref Range    Lipase 228 73 - 393 U/L       Ct Abd Pelv W Cont    Result Date: 4/19/2019  EXAM:  CT ABDOMEN PELVIS WITH CONTRAST INDICATION:  abd pain. Additional history: Midepigastric pain x4 days COMPARISON: None. . TECHNIQUE: Multislice helical CT was performed from the diaphragm to the symphysis pubis with oral and intravenous contrast administration. Contiguous 5 mm axial images were reconstructed and lung and soft tissue windows were generated. Coronal and sagittal reformations were generated. CT dose reduction was achieved through use of a standardized protocol tailored for this examination and automatic exposure control for dose modulation. Karla Justin FINDINGS: INCIDENTALLY IMAGED CHEST: Heart/vessels: Calcifications in the right coronary artery. Lungs/Pleura: Within normal limits. . ABDOMEN: Liver: Within normal limits. Gallbladder/Biliary: Status post cholecystectomy. Spleen: Within normal limits. Pancreas: Within normal limits. Adrenals: Within normal limits. Kidneys: Within normal limits. Peritoneum/Mesenteries: Within normal limits. There are numerous surgical clips anteriorly in the right lower quadrant Extraperitoneum: Minimal stranding about the head of the pancreas.  Gastrointestinal tract: Within normal Yes limits. Truncated appendix with a surgical clip immediately adjacent. Vascular: Calcifications in the aorta. Tamia Barrio PELVIS: Extraperitoneum: Calculi in the right side of the pelvis suggesting phleboliths. Ureters: Within normal limits. Bladder: Within normal limits. Reproductive System: Status post hysterectomy. . MSK: Degenerative changes in the lumbar spine with vacuum disc phenomenon at multiple levels. Small, fat-containing umbilical hernia . IMPRESSION: 1. Minimal stranding about the head of the pancreas. Recommend clinical correlation for pancreatitis.  2. Incidental findings as above

## 2021-02-13 ENCOUNTER — HOSPITAL ENCOUNTER (EMERGENCY)
Age: 66
Discharge: HOME | End: 2021-02-13
Attending: EMERGENCY MEDICINE
Payer: MEDICARE

## 2021-02-13 ENCOUNTER — APPOINTMENT (OUTPATIENT)
Dept: CT IMAGING | Age: 66
End: 2021-02-13
Attending: EMERGENCY MEDICINE
Payer: MEDICARE

## 2021-02-13 VITALS
HEART RATE: 61 BPM | SYSTOLIC BLOOD PRESSURE: 140 MMHG | HEIGHT: 69 IN | OXYGEN SATURATION: 96 % | TEMPERATURE: 97.7 F | RESPIRATION RATE: 27 BRPM | DIASTOLIC BLOOD PRESSURE: 72 MMHG | WEIGHT: 183.42 LBS | BODY MASS INDEX: 27.17 KG/M2

## 2021-02-13 DIAGNOSIS — R10.13 ABDOMINAL PAIN, EPIGASTRIC: Primary | ICD-10-CM

## 2021-02-13 LAB
ALBUMIN SERPL-MCNC: 4 G/DL (ref 3.5–5)
ALBUMIN/GLOB SERPL: 1 {RATIO} (ref 1.1–2.2)
ALP SERPL-CCNC: 86 U/L (ref 45–117)
ALT SERPL-CCNC: 26 U/L (ref 12–78)
ANION GAP SERPL CALC-SCNC: 5 MMOL/L (ref 5–15)
APPEARANCE UR: CLEAR
AST SERPL-CCNC: 19 U/L (ref 15–37)
BASOPHILS # BLD: 0 K/UL (ref 0–0.1)
BASOPHILS NFR BLD: 1 % (ref 0–1)
BILIRUB SERPL-MCNC: 0.2 MG/DL (ref 0.2–1)
BILIRUB UR QL: NEGATIVE
BUN SERPL-MCNC: 14 MG/DL (ref 6–20)
BUN/CREAT SERPL: 16 (ref 12–20)
CALCIUM SERPL-MCNC: 9.7 MG/DL (ref 8.5–10.1)
CHLORIDE SERPL-SCNC: 106 MMOL/L (ref 97–108)
CO2 SERPL-SCNC: 28 MMOL/L (ref 21–32)
COLOR UR: NORMAL
CREAT SERPL-MCNC: 0.9 MG/DL (ref 0.55–1.02)
DIFFERENTIAL METHOD BLD: ABNORMAL
EOSINOPHIL # BLD: 0.2 K/UL (ref 0–0.4)
EOSINOPHIL NFR BLD: 2 % (ref 0–7)
ERYTHROCYTE [DISTWIDTH] IN BLOOD BY AUTOMATED COUNT: 13.8 % (ref 11.5–14.5)
GLOBULIN SER CALC-MCNC: 4.1 G/DL (ref 2–4)
GLUCOSE SERPL-MCNC: 126 MG/DL (ref 65–100)
GLUCOSE UR STRIP.AUTO-MCNC: NEGATIVE MG/DL
HCG SERPL-ACNC: 2 MIU/ML (ref 0–6)
HCT VFR BLD AUTO: 39.8 % (ref 35–47)
HGB BLD-MCNC: 12.9 G/DL (ref 11.5–16)
HGB UR QL STRIP: NEGATIVE
IMM GRANULOCYTES # BLD AUTO: 0 K/UL (ref 0–0.04)
IMM GRANULOCYTES NFR BLD AUTO: 0 % (ref 0–0.5)
KETONES UR QL STRIP.AUTO: NEGATIVE MG/DL
LEUKOCYTE ESTERASE UR QL STRIP.AUTO: NEGATIVE
LIPASE SERPL-CCNC: 76 U/L (ref 73–393)
LYMPHOCYTES # BLD: 3.3 K/UL (ref 0.8–3.5)
LYMPHOCYTES NFR BLD: 39 % (ref 12–49)
MCH RBC QN AUTO: 27.6 PG (ref 26–34)
MCHC RBC AUTO-ENTMCNC: 32.4 G/DL (ref 30–36.5)
MCV RBC AUTO: 85 FL (ref 80–99)
MONOCYTES # BLD: 0.4 K/UL (ref 0–1)
MONOCYTES NFR BLD: 5 % (ref 5–13)
NEUTS SEG # BLD: 4.5 K/UL (ref 1.8–8)
NEUTS SEG NFR BLD: 53 % (ref 32–75)
NITRITE UR QL STRIP.AUTO: NEGATIVE
NRBC # BLD: 0 K/UL (ref 0–0.01)
NRBC BLD-RTO: 0 PER 100 WBC
PH UR STRIP: 5 [PH] (ref 5–8)
PLATELET # BLD AUTO: 340 K/UL (ref 150–400)
PMV BLD AUTO: 8.8 FL (ref 8.9–12.9)
POTASSIUM SERPL-SCNC: 5.1 MMOL/L (ref 3.5–5.1)
PROT SERPL-MCNC: 8.1 G/DL (ref 6.4–8.2)
PROT UR STRIP-MCNC: NEGATIVE MG/DL
RBC # BLD AUTO: 4.68 M/UL (ref 3.8–5.2)
SODIUM SERPL-SCNC: 139 MMOL/L (ref 136–145)
SP GR UR REFRACTOMETRY: 1.02 (ref 1–1.03)
TROPONIN I SERPL-MCNC: <0.05 NG/ML
UROBILINOGEN UR QL STRIP.AUTO: 0.2 EU/DL (ref 0.2–1)
WBC # BLD AUTO: 8.5 K/UL (ref 3.6–11)

## 2021-02-13 PROCEDURE — 74011250636 HC RX REV CODE- 250/636: Performed by: EMERGENCY MEDICINE

## 2021-02-13 PROCEDURE — 80053 COMPREHEN METABOLIC PANEL: CPT

## 2021-02-13 PROCEDURE — 96374 THER/PROPH/DIAG INJ IV PUSH: CPT

## 2021-02-13 PROCEDURE — 36415 COLL VENOUS BLD VENIPUNCTURE: CPT

## 2021-02-13 PROCEDURE — 81003 URINALYSIS AUTO W/O SCOPE: CPT

## 2021-02-13 PROCEDURE — 84702 CHORIONIC GONADOTROPIN TEST: CPT

## 2021-02-13 PROCEDURE — 74011250637 HC RX REV CODE- 250/637: Performed by: EMERGENCY MEDICINE

## 2021-02-13 PROCEDURE — 74011000636 HC RX REV CODE- 636: Performed by: EMERGENCY MEDICINE

## 2021-02-13 PROCEDURE — 84484 ASSAY OF TROPONIN QUANT: CPT

## 2021-02-13 PROCEDURE — 93005 ELECTROCARDIOGRAM TRACING: CPT

## 2021-02-13 PROCEDURE — 99285 EMERGENCY DEPT VISIT HI MDM: CPT

## 2021-02-13 PROCEDURE — 85025 COMPLETE CBC W/AUTO DIFF WBC: CPT

## 2021-02-13 PROCEDURE — 74177 CT ABD & PELVIS W/CONTRAST: CPT

## 2021-02-13 PROCEDURE — 74011000250 HC RX REV CODE- 250: Performed by: EMERGENCY MEDICINE

## 2021-02-13 PROCEDURE — 83690 ASSAY OF LIPASE: CPT

## 2021-02-13 RX ORDER — DICYCLOMINE HYDROCHLORIDE 20 MG/1
20 TABLET ORAL EVERY 6 HOURS
Qty: 10 TAB | Refills: 0 | Status: SHIPPED | OUTPATIENT
Start: 2021-02-13

## 2021-02-13 RX ORDER — FENTANYL CITRATE 50 UG/ML
100 INJECTION, SOLUTION INTRAMUSCULAR; INTRAVENOUS ONCE
Status: COMPLETED | OUTPATIENT
Start: 2021-02-13 | End: 2021-02-13

## 2021-02-13 RX ORDER — PANTOPRAZOLE SODIUM 40 MG/1
40 TABLET, DELAYED RELEASE ORAL DAILY
Qty: 20 TAB | Refills: 0 | Status: SHIPPED | OUTPATIENT
Start: 2021-02-13 | End: 2021-03-05

## 2021-02-13 RX ORDER — PANTOPRAZOLE SODIUM 40 MG/1
40 TABLET, DELAYED RELEASE ORAL DAILY
Qty: 20 TAB | Refills: 0 | Status: SHIPPED | OUTPATIENT
Start: 2021-02-13 | End: 2021-02-13 | Stop reason: SDUPTHER

## 2021-02-13 RX ADMIN — SODIUM CHLORIDE 1000 ML: 9 INJECTION, SOLUTION INTRAVENOUS at 15:08

## 2021-02-13 RX ADMIN — LIDOCAINE HYDROCHLORIDE 40 ML: 20 SOLUTION ORAL; TOPICAL at 16:47

## 2021-02-13 RX ADMIN — FENTANYL CITRATE 100 MCG: 50 INJECTION INTRAMUSCULAR; INTRAVENOUS at 15:08

## 2021-02-13 RX ADMIN — IOPAMIDOL 100 ML: 755 INJECTION, SOLUTION INTRAVENOUS at 15:20

## 2021-02-13 NOTE — ED PROVIDER NOTES
EMERGENCY DEPARTMENT HISTORY AND PHYSICAL EXAM      Date: 2/13/2021  Patient Name: Vinicius Vasquez    History of Presenting Illness     Chief Complaint   Patient presents with    Abdominal Pain     midepigastric pain with nausea and diarrhea x 7 days. Worse after she eats, worse when lying down    Diarrhea         HPI: Vinicius Vasquez, 60 y.o. female presents to the ED with cc of abdominal pain. She has a history of pancreatitis as well as gastric inflammation, seen here for the same issue several years ago, states that pain is identical to that of previous visit. Has had diarrhea but denies vomiting. She denies cough, shortness of breath, chest pain or other upper thoracic symptoms. There are no other complaints, changes, or physical findings at this time. PCP: Quentin Roth MD    No current facility-administered medications on file prior to encounter. Current Outpatient Medications on File Prior to Encounter   Medication Sig Dispense Refill    lisinopril (PRINIVIL, ZESTRIL) 10 mg tablet Take 20 mg by mouth daily.  ondansetron hcl (ZOFRAN) 4 mg tablet Take 1 Tab by mouth every eight (8) hours as needed for Nausea. 20 Tab 0    sucralfate (CARAFATE) 1 gram tablet Take 1 Tab by mouth four (4) times daily. 30 Tab 0    atorvastatin (LIPITOR) 40 mg tablet Take 40 mg by mouth daily.  insulin glargine U-300 conc (TOUJEO SOLOSTAR U-300 INSULIN) 300 unit/mL (1.5 mL) inpn 32 Units by SubCUTAneous route daily.  FLUoxetine (PROZAC) 20 mg tablet Take 60 mg by mouth daily.  metFORMIN (GLUCOPHAGE) 500 mg tablet Take 500 mg by mouth two (2) times daily (with meals).  gabapentin (NEURONTIN) 400 mg capsule Take 600 mg by mouth two (2) times a day.  clonazepam (KLONOPIN) 2 mg tablet Take 4 mg by mouth nightly.          Past History     Past Medical History:  Past Medical History:   Diagnosis Date    Cancer ([facility] [address]     SKIN-LABIA    Chronic pain     Depression with anxiety     Diabetes ([facility] [address]     GERD (gastroesophageal reflux disease)     Labial lesion     precancerous, resected    Multiple sclerosis ([facility] [address]     Psychiatric disorder     DEPRESSION/ ANXIETY    Tobacco abuse        Past Surgical History:  Past Surgical History:   Procedure Laterality Date    HX APPENDECTOMY      HX  SECTION      HX CHOLECYSTECTOMY      HX HERNIA REPAIR      HX HYSTERECTOMY      UPPER GI ENDOSCOPY,BIOPSY  2019            Family History:  Family History   Problem Relation Age of Onset    Heart Disease Father     Diabetes Mother     Parkinsonism Mother     COPD Mother     Breast Cancer Paternal Grandmother         over 48    Anesth Problems Neg Hx        Social History:  Social History     Tobacco Use    Smoking status: Current Every Day Smoker     Packs/day: 0.50     Years: 35.00     Pack years: 17.50     Types: Cigarettes    Smokeless tobacco: Never Used   Substance Use Topics    Alcohol use: No    Drug use: No       Allergies: Allergies   Allergen Reactions    Pcn [Penicillins] Rash         Review of Systems   Review of Systems   Constitutional: Negative for chills and fever. HENT: Negative for rhinorrhea. Eyes: Negative for redness. Respiratory: Negative for shortness of breath. Cardiovascular: Negative for chest pain. Gastrointestinal: Positive for abdominal pain, diarrhea and nausea. Negative for vomiting. Genitourinary: Negative for dysuria. Musculoskeletal: Negative for back pain. Neurological: Negative for syncope. Psychiatric/Behavioral: The patient is not nervous/anxious. All other systems reviewed and are negative. Physical Exam   Physical Exam  Vitals signs and nursing note reviewed. Constitutional:       Appearance: Normal appearance. HENT:      Head: Normocephalic and atraumatic. Mouth/Throat:      Mouth: Mucous membranes are moist.   Eyes:      Extraocular Movements: Extraocular movements intact.    Neck:      Musculoskeletal: Neck supple. Cardiovascular:      Rate and Rhythm: Normal rate and regular rhythm. Pulses: Normal pulses. Pulmonary:      Effort: Pulmonary effort is normal.      Breath sounds: Normal breath sounds. Abdominal:      Palpations: Abdomen is soft. Tenderness: There is abdominal tenderness in the epigastric area. There is no guarding or rebound. Musculoskeletal:         General: No deformity. Skin:     General: Skin is warm and dry. Neurological:      General: No focal deficit present. Mental Status: She is alert. Psychiatric:         Mood and Affect: Mood normal.         Behavior: Behavior normal.         Diagnostic Study Results     Labs -     Recent Results (from the past 24 hour(s))   EKG, 12 LEAD, INITIAL    Collection Time: 02/13/21  2:21 PM   Result Value Ref Range    Ventricular Rate 74 BPM    Atrial Rate 74 BPM    P-R Interval 184 ms    QRS Duration 88 ms    Q-T Interval 384 ms    QTC Calculation (Bezet) 426 ms    Calculated P Axis 75 degrees    Calculated R Axis 59 degrees    Calculated T Axis 92 degrees    Diagnosis       Normal sinus rhythm  Normal ECG  When compared with ECG of 09-FEB-2015 10:40,  No significant change was found     BETA HCG, QT    Collection Time: 02/13/21  2:36 PM   Result Value Ref Range    Beta HCG, QT 2 0 - 6 MIU/ML   CBC WITH AUTOMATED DIFF    Collection Time: 02/13/21  2:36 PM   Result Value Ref Range    WBC 8.5 3.6 - 11.0 K/uL    RBC 4.68 3.80 - 5.20 M/uL    HGB 12.9 11.5 - 16.0 g/dL    HCT 39.8 35.0 - 47.0 %    MCV 85.0 80.0 - 99.0 FL    MCH 27.6 26.0 - 34.0 PG    MCHC 32.4 30.0 - 36.5 g/dL    RDW 13.8 11.5 - 14.5 %    PLATELET 787 616 - 676 K/uL    MPV 8.8 (L) 8.9 - 12.9 FL    NRBC 0.0 0  WBC    ABSOLUTE NRBC 0.00 0.00 - 0.01 K/uL    NEUTROPHILS 53 32 - 75 %    LYMPHOCYTES 39 12 - 49 %    MONOCYTES 5 5 - 13 %    EOSINOPHILS 2 0 - 7 %    BASOPHILS 1 0 - 1 %    IMMATURE GRANULOCYTES 0 0.0 - 0.5 %    ABS. NEUTROPHILS 4.5 1.8 - 8.0 K/UL    ABS. LYMPHOCYTES 3.3 0.8 - 3.5 K/UL    ABS. MONOCYTES 0.4 0.0 - 1.0 K/UL    ABS. EOSINOPHILS 0.2 0.0 - 0.4 K/UL    ABS. BASOPHILS 0.0 0.0 - 0.1 K/UL    ABS. IMM. GRANS. 0.0 0.00 - 0.04 K/UL    DF AUTOMATED     URINALYSIS W/ RFLX MICROSCOPIC    Collection Time: 02/13/21  2:36 PM   Result Value Ref Range    Color YELLOW/STRAW      Appearance CLEAR CLEAR      Specific gravity 1.023 1.003 - 1.030      pH (UA) 5.0 5.0 - 8.0      Protein Negative NEG mg/dL    Glucose Negative NEG mg/dL    Ketone Negative NEG mg/dL    Bilirubin Negative NEG      Blood Negative NEG      Urobilinogen 0.2 0.2 - 1.0 EU/dL    Nitrites Negative NEG      Leukocyte Esterase Negative NEG     METABOLIC PANEL, COMPREHENSIVE    Collection Time: 02/13/21  2:36 PM   Result Value Ref Range    Sodium 139 136 - 145 mmol/L    Potassium 5.1 3.5 - 5.1 mmol/L    Chloride 106 97 - 108 mmol/L    CO2 28 21 - 32 mmol/L    Anion gap 5 5 - 15 mmol/L    Glucose 126 (H) 65 - 100 mg/dL    BUN 14 6 - 20 MG/DL    Creatinine 0.90 0.55 - 1.02 MG/DL    BUN/Creatinine ratio 16 12 - 20      GFR est AA >60 >60 ml/min/1.73m2    GFR est non-AA >60 >60 ml/min/1.73m2    Calcium 9.7 8.5 - 10.1 MG/DL    Bilirubin, total 0.2 0.2 - 1.0 MG/DL    ALT (SGPT) 26 12 - 78 U/L    AST (SGOT) 19 15 - 37 U/L    Alk. phosphatase 86 45 - 117 U/L    Protein, total 8.1 6.4 - 8.2 g/dL    Albumin 4.0 3.5 - 5.0 g/dL    Globulin 4.1 (H) 2.0 - 4.0 g/dL    A-G Ratio 1.0 (L) 1.1 - 2.2     LIPASE    Collection Time: 02/13/21  2:36 PM   Result Value Ref Range    Lipase 76 73 - 393 U/L   TROPONIN I    Collection Time: 02/13/21  2:36 PM   Result Value Ref Range    Troponin-I, Qt. <0.05 <0.05 ng/mL       Radiologic Studies -   CT ABD PELV W CONT   Final Result   Status post appendectomy, hysterectomy, and cholecystectomy. No acute   abnormality. CT Results  (Last 48 hours)               02/13/21 1558  CT ABD PELV W CONT Final result    Impression:  Status post appendectomy, hysterectomy, and cholecystectomy. No acute   abnormality. Narrative:  EXAM: CT ABD PELV W CONT       INDICATION: epigastric pain, diarrhea, hx pancreatitis       COMPARISON: 6/15/2019        CONTRAST: 100 mL of Isovue-370. TECHNIQUE:    Following the uneventful intravenous administration of contrast, thin axial   images were obtained through the abdomen and pelvis. Coronal and sagittal   reconstructions were generated. Oral contrast was not administered. CT dose   reduction was achieved through use of a standardized protocol tailored for this   examination and automatic exposure control for dose modulation. FINDINGS:    LOWER THORAX: No significant abnormality in the incidentally imaged lower chest.   LIVER: No mass. BILIARY TREE: The gallbladder is surgically absent. CBD is not dilated. SPLEEN: within normal limits. PANCREAS: No mass or ductal dilatation. ADRENALS: Unremarkable. KIDNEYS: No mass, calculus, or hydronephrosis. STOMACH: Unremarkable. SMALL BOWEL: No dilatation or wall thickening. COLON: No dilatation or wall thickening. APPENDIX: Surgically absent. PERITONEUM: No ascites or pneumoperitoneum. RETROPERITONEUM: No lymphadenopathy or aortic aneurysm. REPRODUCTIVE ORGANS: The uterus is surgically absent. URINARY BLADDER: No mass or calculus. BONES: Degenerative changes are seen in the lumbar spine. ABDOMINAL WALL: No mass or hernia. ADDITIONAL COMMENTS: N/A               CXR Results  (Last 48 hours)    None            Medical Decision Making   I am the first provider for this patient. I reviewed the vital signs, available nursing notes, past medical history, past surgical history, family history and social history. Vital Signs-Reviewed the patient's vital signs.   Patient Vitals for the past 24 hrs:   Temp Pulse Resp BP SpO2   02/13/21 1730  61 27 (!) 140/72 96 %   02/13/21 1715  66 26 138/72 96 %   02/13/21 1700  61 22 (!) 143/73 96 %   02/13/21 1645  61 20 (!) 143/84 98 % 02/13/21 1630  71 18 (!) 150/87 95 %   02/13/21 1615  71 20 (!) 157/90 95 %   02/13/21 1545  73 20 (!) 160/87 94 %   02/13/21 1530  69 18 (!) 152/90 96 %   02/13/21 1519  69 20 (!) 154/90 97 %   02/13/21 1415 97.7 °F (36.5 °C) 88 16 (!) 173/71 100 %         Provider Notes (Medical Decision Making):   78-year-old lady presenting to ED with chief complaint of epigastric pain. States the pain is identical to previous episodes where she was admitted, diagnosed with pancreatitis versus gastritis. Old records reviewed and indicate endoscopy with mild gastritis at one admission, the other with a CT scan showing possible mild pancreatic inflammation with normal lipase. She has had negative CT scan since. CT scan today demonstrates no acute abnormalities. Labs are all within normal limits including lipase, normal white blood cell count, normal urinalysis. Also evaluated for cardiac etiology, EKG with no ischemia troponins negative, does not sound like ACS. Ultimately, it is unclear why patient is having the symptoms. It has been evaluated extensively in the past without concrete cause identified. I think she would be safe for discharge at this time with plan for close follow-up with gastroenterology and she states she will call them on Monday. She will be encouraged to monitor symptoms closely and return with any worsening of condition or new concerning symptoms. All questions answered and concerns addressed. EKG sinus, normal rate, nonspecific ST T changes, normal QT    ED Course:     Initial assessment performed. The patients presenting problems have been discussed, and they are in agreement with the care plan formulated and outlined with them. I have encouraged them to ask questions as they arise throughout their visit. Disposition:  dc    PLAN:  1.    Discharge Medication List as of 2/13/2021  5:15 PM      START taking these medications    Details   pantoprazole (Protonix) 40 mg tablet Take 1 Tab by mouth daily for 20 days. , Normal, Disp-20 Tab, R-0         CONTINUE these medications which have NOT CHANGED    Details   lisinopril (PRINIVIL, ZESTRIL) 10 mg tablet Take 20 mg by mouth daily. , Historical Med      ondansetron hcl (ZOFRAN) 4 mg tablet Take 1 Tab by mouth every eight (8) hours as needed for Nausea., Normal, Disp-20 Tab, R-0      sucralfate (CARAFATE) 1 gram tablet Take 1 Tab by mouth four (4) times daily. , Normal, Disp-30 Tab, R-0      atorvastatin (LIPITOR) 40 mg tablet Take 40 mg by mouth daily. , Historical Med      insulin glargine U-300 conc (TOUJEO SOLOSTAR U-300 INSULIN) 300 unit/mL (1.5 mL) inpn 32 Units by SubCUTAneous route daily. , Historical Med      FLUoxetine (PROZAC) 20 mg tablet Take 60 mg by mouth daily. , Historical Med      metFORMIN (GLUCOPHAGE) 500 mg tablet Take 500 mg by mouth two (2) times daily (with meals). , Historical Med      gabapentin (NEURONTIN) 400 mg capsule Take 600 mg by mouth two (2) times a day., Historical Med      clonazepam (KLONOPIN) 2 mg tablet Take 4 mg by mouth nightly., Historical Med           2.    Follow-up Information    None       Return to ED if worse     Diagnosis     Clinical Impression: abd pain

## 2021-02-13 NOTE — ED NOTES
Pt received from triage with c/o abdominal pain x 7 days with N/D. Pt reports this has happened to her before and \"last time it was pancreatitis\". Pt is A&Ox4 and on cardiac monitor.

## 2021-02-13 NOTE — ED NOTES
Patient discharged by Dr. Jose Bell. Patient provided with discharge instructions Rx and instructions on follow up care. Patient out of ED.

## 2021-02-14 LAB
ATRIAL RATE: 74 BPM
CALCULATED P AXIS, ECG09: 75 DEGREES
CALCULATED R AXIS, ECG10: 59 DEGREES
CALCULATED T AXIS, ECG11: 92 DEGREES
DIAGNOSIS, 93000: NORMAL
P-R INTERVAL, ECG05: 184 MS
Q-T INTERVAL, ECG07: 384 MS
QRS DURATION, ECG06: 88 MS
QTC CALCULATION (BEZET), ECG08: 426 MS
VENTRICULAR RATE, ECG03: 74 BPM

## 2021-02-27 ENCOUNTER — HOSPITAL ENCOUNTER (OUTPATIENT)
Dept: PREADMISSION TESTING | Age: 66
Discharge: HOME | End: 2021-02-27
Payer: MEDICARE

## 2021-02-27 LAB — SARS-COV-2, COV2: NORMAL

## 2021-02-27 PROCEDURE — U0003 INFECTIOUS AGENT DETECTION BY NUCLEIC ACID (DNA OR RNA); SEVERE ACUTE RESPIRATORY SYNDROME CORONAVIRUS 2 (SARS-COV-2) (CORONAVIRUS DISEASE [COVID-19]), AMPLIFIED PROBE TECHNIQUE, MAKING USE OF HIGH THROUGHPUT TECHNOLOGIES AS DESCRIBED BY CMS-2020-01-R: HCPCS

## 2021-03-01 LAB — SARS-COV-2, COV2NT: NOT DETECTED

## 2021-04-24 ENCOUNTER — HOSPITAL ENCOUNTER (OUTPATIENT)
Dept: PREADMISSION TESTING | Age: 66
Discharge: HOME | End: 2021-04-24
Payer: MEDICARE

## 2021-04-24 LAB — SARS-COV-2, COV2: NORMAL

## 2021-04-24 PROCEDURE — U0003 INFECTIOUS AGENT DETECTION BY NUCLEIC ACID (DNA OR RNA); SEVERE ACUTE RESPIRATORY SYNDROME CORONAVIRUS 2 (SARS-COV-2) (CORONAVIRUS DISEASE [COVID-19]), AMPLIFIED PROBE TECHNIQUE, MAKING USE OF HIGH THROUGHPUT TECHNOLOGIES AS DESCRIBED BY CMS-2020-01-R: HCPCS

## 2021-04-25 LAB — SARS-COV-2, COV2NT: NOT DETECTED

## 2021-04-27 RX ORDER — PANCRELIPASE 60000; 12000; 38000 [USP'U]/1; [USP'U]/1; [USP'U]/1
3 CAPSULE, DELAYED RELEASE PELLETS ORAL
COMMUNITY

## 2021-04-27 RX ORDER — ROSUVASTATIN CALCIUM 40 MG/1
40 TABLET, COATED ORAL
COMMUNITY

## 2021-04-28 ENCOUNTER — ANESTHESIA (OUTPATIENT)
Dept: ENDOSCOPY | Age: 66
End: 2021-04-28
Payer: MEDICARE

## 2021-04-28 ENCOUNTER — ANESTHESIA EVENT (OUTPATIENT)
Dept: ENDOSCOPY | Age: 66
End: 2021-04-28
Payer: MEDICARE

## 2021-04-28 ENCOUNTER — HOSPITAL ENCOUNTER (OUTPATIENT)
Age: 66
Setting detail: OUTPATIENT SURGERY
Discharge: HOME | End: 2021-04-28
Attending: INTERNAL MEDICINE | Admitting: INTERNAL MEDICINE
Payer: MEDICARE

## 2021-04-28 VITALS
TEMPERATURE: 97.7 F | DIASTOLIC BLOOD PRESSURE: 58 MMHG | OXYGEN SATURATION: 98 % | HEIGHT: 69 IN | HEART RATE: 69 BPM | WEIGHT: 182.38 LBS | BODY MASS INDEX: 27.01 KG/M2 | SYSTOLIC BLOOD PRESSURE: 115 MMHG | RESPIRATION RATE: 19 BRPM

## 2021-04-28 PROCEDURE — 74011250636 HC RX REV CODE- 250/636: Performed by: NURSE ANESTHETIST, CERTIFIED REGISTERED

## 2021-04-28 PROCEDURE — 74011000250 HC RX REV CODE- 250: Performed by: NURSE ANESTHETIST, CERTIFIED REGISTERED

## 2021-04-28 PROCEDURE — 88305 TISSUE EXAM BY PATHOLOGIST: CPT

## 2021-04-28 PROCEDURE — 76040000019: Performed by: INTERNAL MEDICINE

## 2021-04-28 PROCEDURE — 2709999900 HC NON-CHARGEABLE SUPPLY: Performed by: INTERNAL MEDICINE

## 2021-04-28 PROCEDURE — 77030019988 HC FCPS ENDOSC DISP BSC -B: Performed by: INTERNAL MEDICINE

## 2021-04-28 PROCEDURE — 76060000031 HC ANESTHESIA FIRST 0.5 HR: Performed by: INTERNAL MEDICINE

## 2021-04-28 RX ORDER — NALOXONE HYDROCHLORIDE 0.4 MG/ML
0.4 INJECTION, SOLUTION INTRAMUSCULAR; INTRAVENOUS; SUBCUTANEOUS
Status: DISCONTINUED | OUTPATIENT
Start: 2021-04-28 | End: 2021-04-28 | Stop reason: HOSPADM

## 2021-04-28 RX ORDER — DEXTROMETHORPHAN/PSEUDOEPHED 2.5-7.5/.8
1.2 DROPS ORAL
Status: DISCONTINUED | OUTPATIENT
Start: 2021-04-28 | End: 2021-04-28 | Stop reason: HOSPADM

## 2021-04-28 RX ORDER — SODIUM CHLORIDE 9 MG/ML
25 INJECTION, SOLUTION INTRAVENOUS CONTINUOUS
Status: DISCONTINUED | OUTPATIENT
Start: 2021-04-28 | End: 2021-04-28 | Stop reason: HOSPADM

## 2021-04-28 RX ORDER — EPINEPHRINE 0.1 MG/ML
1 INJECTION INTRACARDIAC; INTRAVENOUS
Status: DISCONTINUED | OUTPATIENT
Start: 2021-04-28 | End: 2021-04-28 | Stop reason: HOSPADM

## 2021-04-28 RX ORDER — SODIUM CHLORIDE, SODIUM LACTATE, POTASSIUM CHLORIDE, CALCIUM CHLORIDE 600; 310; 30; 20 MG/100ML; MG/100ML; MG/100ML; MG/100ML
INJECTION, SOLUTION INTRAVENOUS
Status: DISCONTINUED | OUTPATIENT
Start: 2021-04-28 | End: 2021-04-28 | Stop reason: HOSPADM

## 2021-04-28 RX ORDER — LIDOCAINE HYDROCHLORIDE 20 MG/ML
INJECTION, SOLUTION EPIDURAL; INFILTRATION; INTRACAUDAL; PERINEURAL AS NEEDED
Status: DISCONTINUED | OUTPATIENT
Start: 2021-04-28 | End: 2021-04-28 | Stop reason: HOSPADM

## 2021-04-28 RX ORDER — PROPOFOL 10 MG/ML
INJECTION, EMULSION INTRAVENOUS AS NEEDED
Status: DISCONTINUED | OUTPATIENT
Start: 2021-04-28 | End: 2021-04-28 | Stop reason: HOSPADM

## 2021-04-28 RX ORDER — SODIUM CHLORIDE 0.9 % (FLUSH) 0.9 %
5-40 SYRINGE (ML) INJECTION EVERY 8 HOURS
Status: DISCONTINUED | OUTPATIENT
Start: 2021-04-28 | End: 2021-04-28 | Stop reason: HOSPADM

## 2021-04-28 RX ORDER — FLUMAZENIL 0.1 MG/ML
0.2 INJECTION INTRAVENOUS
Status: DISCONTINUED | OUTPATIENT
Start: 2021-04-28 | End: 2021-04-28 | Stop reason: HOSPADM

## 2021-04-28 RX ORDER — ATROPINE SULFATE 0.1 MG/ML
0.5 INJECTION INTRAVENOUS
Status: DISCONTINUED | OUTPATIENT
Start: 2021-04-28 | End: 2021-04-28 | Stop reason: HOSPADM

## 2021-04-28 RX ORDER — SODIUM CHLORIDE 0.9 % (FLUSH) 0.9 %
5-40 SYRINGE (ML) INJECTION AS NEEDED
Status: DISCONTINUED | OUTPATIENT
Start: 2021-04-28 | End: 2021-04-28 | Stop reason: HOSPADM

## 2021-04-28 RX ADMIN — SODIUM CHLORIDE, POTASSIUM CHLORIDE, SODIUM LACTATE AND CALCIUM CHLORIDE: 600; 310; 30; 20 INJECTION, SOLUTION INTRAVENOUS at 07:43

## 2021-04-28 RX ADMIN — PROPOFOL 100 MG: 10 INJECTION, EMULSION INTRAVENOUS at 07:49

## 2021-04-28 RX ADMIN — PROPOFOL 20 MG: 10 INJECTION, EMULSION INTRAVENOUS at 07:51

## 2021-04-28 RX ADMIN — LIDOCAINE HYDROCHLORIDE 100 MG: 20 INJECTION, SOLUTION EPIDURAL; INFILTRATION; INTRACAUDAL; PERINEURAL at 07:49

## 2021-04-28 RX ADMIN — PROPOFOL 20 MG: 10 INJECTION, EMULSION INTRAVENOUS at 07:53

## 2021-04-28 NOTE — PROGRESS NOTES
Endoscope was pre-cleaned at the bedside immediately following procedure by Justa Rudolph donnie  Medications     Medication Rate/Dose/Volume Action Route Date Time   Administering User Audit    lidocaine (PF) 2% (mg) 100 mg Given IntraVENous 04/28/21  0749  Buffy Block CRNA     propofol 10 mg/mL (mg) 100 mg Given IntraVENous 04/28/21  0749  Buffy Block CRNA      20 mg Given IntraVENous  0751  Buffy Block CRNA      20 mg Given IntraVENous  6081  Buffy Block CRNA     LR (mL)  New Bag IntraVENous 04/28/21  0743  Buffy Block CRNA      200 mL Anesthesia Volume Adjustment IntraVENous  0757  Buffy Block CRNA         .

## 2021-04-28 NOTE — ROUTINE PROCESS
Fabien Magallanes  1955  [id number]    Situation:  Verbal report received from: Sujit Houston Rn  Procedure: Procedure(s):  ESOPHAGOGASTRODUODENOSCOPY (EGD)  ESOPHAGOGASTRODUODENAL (EGD) BIOPSY    Background:    Preoperative diagnosis: EPIGASTRIC PAIN  Postoperative diagnosis: Gastritis    :  Dr. Dangelo Vinson  Assistant(s): Endoscopy Technician-1: Pako Parra  Endoscopy RN-1: Lynsey Bridges RN    Specimens:   ID Type Source Tests Collected by Time Destination   1 : Gastric BX Preservative Gastric  Ziyad Tesfaye MD 4/28/2021 7002 Pathology     H. Pylori  no    Assessment:  Intra-procedure medications     Anesthesia gave intra-procedure sedation and medications, see anesthesia flow sheet     Intravenous fluids: NS@ KVO     Vital signs stable     Abdominal assessment: round and soft ecommendation:  Discharge patient per MD order.     Family or Friend   Permission to share finding with family or friend

## 2021-04-28 NOTE — ANESTHESIA PREPROCEDURE EVALUATION
Anesthetic History   No history of anesthetic complications            Review of Systems / Medical History  Patient summary reviewed, nursing notes reviewed and pertinent labs reviewed    Pulmonary          Smoker  Asthma     Comments: Current Every Day Smoker - 17.5 pack years   Neuro/Psych         Psychiatric history    Comments: Multiple sclerosis   Depression  Anxiety Cardiovascular  Within defined limits                Exercise tolerance: >4 METS  Comments: EKG 2021  Normal sinus rhythm   Nonspecific T wave abnormality   Poor R-wave Progression (consider lead placement or loss of anterior forces   GI/Hepatic/Renal     GERD           Endo/Other    Diabetes         Other Findings   Comments: Epigastric pain    Chronic otitis externa of right ear    Multiple Sclerosis, well-controlled, no sx currently, not on any steroids.          Physical Exam    Airway  Mallampati: I  TM Distance: 4 - 6 cm  Neck ROM: normal range of motion   Mouth opening: Normal     Cardiovascular  Regular rate and rhythm,  S1 and S2 normal,  no murmur, click, rub, or gallop             Dental  No notable dental hx       Pulmonary  Breath sounds clear to auscultation               Abdominal  GI exam deferred       Other Findings            Anesthetic Plan    ASA: 3  Anesthesia type: total IV anesthesia and MAC          Induction: Intravenous  Anesthetic plan and risks discussed with: Patient

## 2021-04-28 NOTE — PROCEDURES
NAME:  Alpa Friedman   :   1955   MRN:   [id number]     Date/Time:  2021 7:37 AM    Esophagogastroduodenoscopy (EGD) Procedure Note    Procedure: Esophagogastroduodenoscopy with biopsy    Indication: epigastric pain  Pre-operative Diagnosis: see indication above  Post-operative Diagnosis: see findings below  :  Blane Opitz, MD  Referring Provider:   -Lottie White MD    Exam:  Airway: clear, no airway problems anticipated  Heart: RRR, without gallops or rubs  Lungs: clear bilaterally without wheezes, crackles, or rhonchi  Abdomen: soft, nontender, nondistended, bowel sounds present  Mental Status: awake, alert and oriented to person, place and time     Anethesia/Sedation:  MAC anesthesia Propofol  Procedure Details   After informed consent was obtained for the procedure, with all risks and benefits of procedure explained the patient was taken to the endoscopy suite and placed in the left lateral decubitus position. Following sequential administration of sedation as per above, the [id number] gastroscope was inserted into the mouth and advanced under direct vision to third portion of the duodenum. A careful inspection was made as the gastroscope was withdrawn, including a retroflexed view of the proximal stomach; findings and interventions are described below. Findings:   OROPHARYNX: Cords and pyriform recesses normal.   ESOPHAGUS: The esophagus is normal. The proximal esophagus has two small inlet patches, mid, and distal portions are normal. The Z-Line is intact. STOMACH: The fundus on antegrade and retroflex views is normal. The body, antrum, and pylorus are mildly erythematous, biopsied. DUODENUM: The bulb, second and third portions are normal.    Therapies:  biopsy of stomach body, antrum    Specimens: gastric    EBL:  None. Complications:   None; patient tolerated the procedure well.            Impression:  -- mild gastritis, biopsied  -- small gastric inlet patches in proximal esophagus  -- no clear explanation for symptoms     Recommendations:  --  Await pathology  -- continue PPI    Discharge disposition:  Home in the company of  when able to ambulate    Negrito Gregory MD

## 2021-04-28 NOTE — H&P
Gastroenterology Outpatient History and Physical    Patient: Thong Denise    Physician: Pablo Mayo MD    Chief Complaint: epigastric pain  History of Present Illness: 59 yo F with epigastric pain, nausea. History:  Past Medical History:   Diagnosis Date    Cancer ([facility] [address]     SKIN-LABIA    Chronic pain     Depression with anxiety     Diabetes (HCC)     type 2    GERD (gastroesophageal reflux disease)     High cholesterol     Labial lesion     precancerous, resected    Multiple sclerosis (HCC)     Psychiatric disorder     DEPRESSION/ ANXIETY    Tobacco abuse       Past Surgical History:   Procedure Laterality Date    HX APPENDECTOMY      HX  SECTION      HX CHOLECYSTECTOMY      HX COLONOSCOPY      HX HERNIA REPAIR      HX HYSTERECTOMY      UPPER GI ENDOSCOPY,BIOPSY  2019           Social History     Socioeconomic History    Marital status:      Spouse name: Not on file    Number of children: Not on file    Years of education: Not on file    Highest education level: Not on file   Occupational History     Employer: NOT EMPLOYED     Comment: on disability   Tobacco Use    Smoking status: Current Every Day Smoker     Packs/day: 0.50     Years: 35.00     Pack years: 17.50     Types: Cigarettes    Smokeless tobacco: Never Used   Substance and Sexual Activity    Alcohol use: No    Drug use: No      Family History   Problem Relation Age of Onset    Heart Disease Father     Diabetes Mother     Parkinsonism Mother     COPD Mother     Breast Cancer Paternal Grandmother         over 48    Anesth Problems Neg Hx       Patient Active Problem List   Diagnosis Code    Acute asthmatic bronchitis J45.909    Uncontrolled type 2 diabetes mellitus with polyneuropathy ([facility] [address] E11.42, E11.65    Anxiety state, unspecified F41.1    Chronic otitis externa of right ear H60.61    Abdominal pain R10.9       Allergies:    Allergies   Allergen Reactions    Pcn [Penicillins] Rash Medications:   Prior to Admission medications    Medication Sig Start Date End Date Taking? Authorizing Provider   rosuvastatin (Crestor) 40 mg tablet Take 40 mg by mouth nightly. Yes Provider, Historical   lipase-protease-amylase (Creon) 12,000-38,000 -60,000 unit capsule Take 3 Caps by mouth three (3) times daily (with meals). Or 3 pills daily   Yes Provider, Historical   dicyclomine (BENTYL) 20 mg tablet Take 1 Tab by mouth every six (6) hours. Patient taking differently: Take 20 mg by mouth every six (6) hours as needed. 2/13/21  Yes Oscar Plasencia MD   lisinopril (PRINIVIL, ZESTRIL) 10 mg tablet Take 20 mg by mouth daily. Yes Other, MD Nery   insulin glargine U-300 conc (TOUJEO SOLOSTAR U-300 INSULIN) 300 unit/mL (1.5 mL) inpn 44 Units by SubCUTAneous route daily. Yes Provider, Historical   FLUoxetine (PROZAC) 20 mg tablet Take 60 mg by mouth daily. Yes Provider, Historical   metFORMIN (GLUCOPHAGE) 500 mg tablet Take 500 mg by mouth two (2) times daily (with meals). Yes Provider, Historical   gabapentin (NEURONTIN) 400 mg capsule Take 600 mg by mouth two (2) times a day. Yes Provider, Historical   clonazepam (KLONOPIN) 2 mg tablet Take 4 mg by mouth nightly. Yes Provider, Historical     Physical Exam:   Vital Signs: There were no vitals taken for this visit.   General: well developed, well nourished   HEENT: unremarkable   Heart: regular rhythm no mumur    Lungs: clear   Abdominal:  benign   Neurological: unremarkable   Extremities: no edema     Findings/Diagnosis: epigastric pain  Plan of Care/Planned Procedure: EGD with conscious/deep sedation    Signed:  Rubén Alfaro MD 4/28/2021

## 2021-04-28 NOTE — DISCHARGE INSTRUCTIONS
Choco Cleveland  [id number]  1955    EGD DISCHARGE INSTRUCTIONS  Discomfort:  Sore throat- throat lozenges or warm salt water gargle  redness at IV site- apply warm compress to area; if redness or soreness persist- contact your physician  Gaseous discomfort- walking, belching will help relieve any discomfort  You may not operate a vehicle for 12 hours  You may not engage in an occupation involving machinery or appliances for rest of today  You may not drink alcoholic beverages for at least 12 hours  Avoid making any critical decisions for at least 24 hour  DIET  You may have minimal sips at this time-- do not eat or drink for two hours. You may eat and drink after you wake up  You may resume your regular diet - however -  remember your colon is empty and a heavy meal will produce gas. Avoid these foods:  vegetables, fried / greasy foods, carbonated drinks    MEDICATIONS:  See attached    ACTIVITY  You may resume your normal daily activities until tomorrow AM;  Spend the remainder of the day resting -  avoid any strenuous activity. CALL M.D.   ANY SIGN OF   Increasing pain, nausea, vomiting  Abdominal distension (swelling)  New increased bleeding (oral or rectal)  Fever (chills)  Pain in chest area  Bloody discharge from nose or mouth  Shortness of breath      IMPRESSION:  --No ulcers, no cancer, no major concerns, though no clear answer for cause of discomfort  -- I did take some biopsies in the stomach, to look for any clues or inflammation    Follow-up Instructions:   Call Dr. Dee Reeves for the results of procedure / biopsy in 7-10 days   Telephone # [phone]  Appointment in 2-3 months    Bev Diaz MD

## 2021-10-23 PROBLEM — M25.511 RIGHT SHOULDER PAIN: Status: ACTIVE | Noted: 2021-10-23

## 2021-10-23 PROBLEM — Z98.890 S/P ROTATOR CUFF REPAIR: Status: ACTIVE | Noted: 2021-10-23

## 2021-10-23 PROBLEM — M75.121 COMPLETE TEAR OF RIGHT ROTATOR CUFF: Status: ACTIVE | Noted: 2021-10-23

## 2021-10-23 PROBLEM — M25.512 LEFT SHOULDER PAIN: Status: ACTIVE | Noted: 2021-10-23

## 2021-10-23 PROBLEM — R29.898 ARM WEAKNESS: Status: ACTIVE | Noted: 2021-10-23

## 2021-10-23 PROBLEM — M75.122 COMPLETE ROTATOR CUFF TEAR OF LEFT SHOULDER: Status: ACTIVE | Noted: 2021-10-23

## 2021-11-09 ENCOUNTER — OFFICE VISIT (OUTPATIENT)
Dept: ORTHOPEDIC SURGERY | Age: 66
End: 2021-11-09
Payer: MEDICARE

## 2021-11-09 DIAGNOSIS — Z98.890 S/P ROTATOR CUFF REPAIR: Primary | ICD-10-CM

## 2021-11-09 PROCEDURE — 1101F PT FALLS ASSESS-DOCD LE1/YR: CPT | Performed by: ORTHOPAEDIC SURGERY

## 2021-11-09 PROCEDURE — G8419 CALC BMI OUT NRM PARAM NOF/U: HCPCS | Performed by: ORTHOPAEDIC SURGERY

## 2021-11-09 PROCEDURE — G8427 DOCREV CUR MEDS BY ELIG CLIN: HCPCS | Performed by: ORTHOPAEDIC SURGERY

## 2021-11-09 PROCEDURE — 3017F COLORECTAL CA SCREEN DOC REV: CPT | Performed by: ORTHOPAEDIC SURGERY

## 2021-11-09 PROCEDURE — G8400 PT W/DXA NO RESULTS DOC: HCPCS | Performed by: ORTHOPAEDIC SURGERY

## 2021-11-09 PROCEDURE — G8536 NO DOC ELDER MAL SCRN: HCPCS | Performed by: ORTHOPAEDIC SURGERY

## 2021-11-09 PROCEDURE — 99024 POSTOP FOLLOW-UP VISIT: CPT | Performed by: ORTHOPAEDIC SURGERY

## 2021-11-09 PROCEDURE — G8432 DEP SCR NOT DOC, RNG: HCPCS | Performed by: ORTHOPAEDIC SURGERY

## 2021-11-09 PROCEDURE — 1090F PRES/ABSN URINE INCON ASSESS: CPT | Performed by: ORTHOPAEDIC SURGERY

## 2021-11-09 RX ORDER — KETOROLAC TROMETHAMINE 5 MG/ML
SOLUTION OPHTHALMIC
COMMUNITY
Start: 2021-09-07

## 2021-11-09 RX ORDER — FLUOXETINE HYDROCHLORIDE 20 MG/1
CAPSULE ORAL
COMMUNITY
Start: 2021-10-12 | End: 2021-11-09

## 2021-11-09 RX ORDER — OFLOXACIN 3 MG/ML
SOLUTION/ DROPS OPHTHALMIC
COMMUNITY
Start: 2021-09-07

## 2021-11-09 RX ORDER — BLOOD SUGAR DIAGNOSTIC
STRIP MISCELLANEOUS
COMMUNITY
Start: 2021-08-13

## 2021-11-09 RX ORDER — PREDNISOLONE ACETATE 10 MG/ML
SUSPENSION/ DROPS OPHTHALMIC
COMMUNITY
Start: 2021-09-30

## 2021-11-09 RX ORDER — LISINOPRIL 5 MG/1
TABLET ORAL
COMMUNITY
Start: 2021-08-12 | End: 2021-11-09

## 2021-11-09 NOTE — PROGRESS NOTES
Parminder Smith (: 1955) is a 73 y.o. female, patient, here for evaluation of the following chief complaint(s):  Shoulder Pain (status post shoulder surgery)       HPI:    Patient returns the office status post rotator cuff repair to the right. She is doing well. She has no complaints. She is progressing well with therapy. Allergies   Allergen Reactions    Pcn [Penicillins] Rash       Current Outpatient Medications   Medication Sig    OneTouch Ultra Test strip USE AS DIRECTED BY PRESCRIBER TWICE DAILY    ketorolac (ACULAR) 0.5 % ophthalmic solution     ofloxacin (FLOXIN) 0.3 % ophthalmic solution INSTILL 1 DROP TO SURGERY EYE FOUR TIMES DAILY. START 2 DAYS BEFORE SURGERY    prednisoLONE acetate (PRED FORTE) 1 % ophthalmic suspension SHAKE LIQUID AND INSTILL 1 DROP IN BOTH EYES FOUR TIMES DAILY    rosuvastatin (Crestor) 40 mg tablet Take 40 mg by mouth nightly.  lipase-protease-amylase (Creon) 12,000-38,000 -60,000 unit capsule Take 3 Caps by mouth three (3) times daily (with meals). Or 3 pills daily    dicyclomine (BENTYL) 20 mg tablet Take 1 Tab by mouth every six (6) hours. (Patient taking differently: Take 20 mg by mouth every six (6) hours as needed.)    lisinopril (PRINIVIL, ZESTRIL) 10 mg tablet Take 20 mg by mouth daily.  insulin glargine U-300 conc (TOUJEO SOLOSTAR U-300 INSULIN) 300 unit/mL (1.5 mL) inpn 44 Units by SubCUTAneous route daily.  FLUoxetine (PROZAC) 20 mg tablet Take 60 mg by mouth daily.  metFORMIN (GLUCOPHAGE) 500 mg tablet Take 500 mg by mouth two (2) times daily (with meals).  gabapentin (NEURONTIN) 400 mg capsule Take 600 mg by mouth two (2) times a day.  clonazepam (KLONOPIN) 2 mg tablet Take 4 mg by mouth nightly. No current facility-administered medications for this visit.        Past Medical History:   Diagnosis Date    Arm weakness 10/23/2021    Cancer (HCC)     SKIN-LABIA    Chronic pain     Complete rotator cuff tear of left shoulder 10/23/2021    Complete tear of right rotator cuff 10/23/2021    Depression with anxiety     Diabetes (HCC)     type 2    GERD (gastroesophageal reflux disease)     High cholesterol     Labial lesion     precancerous, resected    Left shoulder pain 10/23/2021    Multiple sclerosis ([address]     Psychiatric disorder     DEPRESSION/ ANXIETY    Right shoulder pain 10/23/2021    S/P rotator cuff repair 10/23/2021    Tobacco abuse         Past Surgical History:   Procedure Laterality Date    HX APPENDECTOMY      HX  SECTION      HX CHOLECYSTECTOMY      HX COLONOSCOPY      HX HERNIA REPAIR      HX HYSTERECTOMY      UPPER GI ENDOSCOPY,BIOPSY  2019         UPPER GI ENDOSCOPY,BIOPSY  2021            Family History   Problem Relation Age of Onset    Heart Disease Father     Asthma Father     Diabetes Father     Elevated Lipids Father     Hypertension Father     Alcohol abuse Father     Diabetes Mother     Parkinsonism Mother     COPD Mother     Arthritis-osteo Mother     Asthma Mother     Heart Disease Mother     Elevated Lipids Mother     Hypertension Mother     Breast Cancer Paternal Grandmother         over 48    Depression Sister     Diabetes Sister     Heart Disease Sister     Depression Brother     Alcohol abuse Brother     Anesth Problems Neg Hx         Social History     Socioeconomic History    Marital status:      Spouse name: Not on file    Number of children: Not on file    Years of education: Not on file    Highest education level: Not on file   Occupational History     Employer: NOT EMPLOYED     Comment: on disability   Tobacco Use    Smoking status: Current Every Day Smoker     Packs/day: 0.50     Years: 35.00     Pack years: 17.50     Types: Cigarettes    Smokeless tobacco: Never Used   Vaping Use    Vaping Use: Never used   Substance and Sexual Activity    Alcohol use: No    Drug use: No    Sexual activity: Not on file   Other Topics Concern    Not on file   Social History Narrative    Not on file     Social Determinants of Health     Financial Resource Strain:     Difficulty of Paying Living Expenses: Not on file   Food Insecurity:     Worried About Running Out of Food in the Last Year: Not on file    Seth of Food in the Last Year: Not on file   Transportation Needs:     Lack of Transportation (Medical): Not on file    Lack of Transportation (Non-Medical): Not on file   Physical Activity:     Days of Exercise per Week: Not on file    Minutes of Exercise per Session: Not on file   Stress:     Feeling of Stress : Not on file   Social Connections:     Frequency of Communication with Friends and Family: Not on file    Frequency of Social Gatherings with Friends and Family: Not on file    Attends Congregational Services: Not on file    Active Member of [address] [place] [address] [employer] or Organizations: Not on file    Attends Club or Organization Meetings: Not on file    Marital Status: Not on file   Intimate Partner Violence:     Fear of Current or Ex-Partner: Not on file    Emotionally Abused: Not on file    Physically Abused: Not on file    Sexually Abused: Not on file   Housing Stability:     Unable to Pay for Housing in the Last Year: Not on file    Number of [place] in the Last Year: Not on file    Unstable Housing in the Last Year: Not on file       Review of Systems   Constitutional: Negative. HENT: Negative. Eyes: Negative. Respiratory: Negative. Cardiovascular: Negative. Gastrointestinal: Negative. Endocrine: Negative. Genitourinary: Negative. Musculoskeletal: Negative. Shoulder surgery   Skin: Negative. Allergic/Immunologic: Negative. Neurological: Negative. Hematological: Negative. Psychiatric/Behavioral: Negative. All other systems reviewed and are negative. Vitals: There were no vitals taken for this visit. There is no height or weight on file to calculate BMI.     Ortho Exam Right shoulder: Portal sites of healed. Passively she has 110 degrees of forward elevation, 80 degrees of abduction and 60 degrees of external rotation. No active range of motion was attempted today    ASSESSMENT/PLAN:    Patient is doing very well. We will progress her into phase 2. We will discontinue her sling at this stage. I gone over the restrictions.   Patient is to return to the office in 4 weeks        Francisco Levi MD

## 2021-11-30 ENCOUNTER — OFFICE VISIT (OUTPATIENT)
Dept: ORTHOPEDIC SURGERY | Age: 66
End: 2021-11-30
Payer: MEDICARE

## 2021-11-30 DIAGNOSIS — Z98.890 STATUS POST ROTATOR CUFF REPAIR: Primary | ICD-10-CM

## 2021-11-30 PROCEDURE — 99024 POSTOP FOLLOW-UP VISIT: CPT | Performed by: ORTHOPAEDIC SURGERY

## 2021-11-30 NOTE — PROGRESS NOTES
Ruiz Pedroza (: 1955) is a 73 y.o. female, patient, here for evaluation of the following chief complaint(s):  Shoulder Pain (right shoulder post op)       HPI:    Patient returns to the office now status post rotator cuff repair. She is here today a little early. She states she went out of town to stay at her cabin and she got wrapped up on a blanket and fell to the floor. Ever since that time, she has had some level of discomfort in the shoulder. She states she was doing quite well before that time. Patient states that when she fell her arm was forced forward. She did not develop any swelling or black and blue. It is maintained at baseline level of pain since this time. Allergies   Allergen Reactions    Pcn [Penicillins] Rash       Current Outpatient Medications   Medication Sig    OneTouch Ultra Test strip USE AS DIRECTED BY PRESCRIBER TWICE DAILY    ketorolac (ACULAR) 0.5 % ophthalmic solution     ofloxacin (FLOXIN) 0.3 % ophthalmic solution INSTILL 1 DROP TO SURGERY EYE FOUR TIMES DAILY. START 2 DAYS BEFORE SURGERY    prednisoLONE acetate (PRED FORTE) 1 % ophthalmic suspension SHAKE LIQUID AND INSTILL 1 DROP IN BOTH EYES FOUR TIMES DAILY    rosuvastatin (Crestor) 40 mg tablet Take 40 mg by mouth nightly.  lipase-protease-amylase (Creon) 12,000-38,000 -60,000 unit capsule Take 3 Caps by mouth three (3) times daily (with meals). Or 3 pills daily    dicyclomine (BENTYL) 20 mg tablet Take 1 Tab by mouth every six (6) hours. (Patient taking differently: Take 20 mg by mouth every six (6) hours as needed.)    lisinopril (PRINIVIL, ZESTRIL) 10 mg tablet Take 20 mg by mouth daily.  insulin glargine U-300 conc (TOUJEO SOLOSTAR U-300 INSULIN) 300 unit/mL (1.5 mL) inpn 44 Units by SubCUTAneous route daily.  FLUoxetine (PROZAC) 20 mg tablet Take 60 mg by mouth daily.  metFORMIN (GLUCOPHAGE) 500 mg tablet Take 500 mg by mouth two (2) times daily (with meals).     gabapentin (NEURONTIN) 400 mg capsule Take 600 mg by mouth two (2) times a day.  clonazepam (KLONOPIN) 2 mg tablet Take 4 mg by mouth nightly. No current facility-administered medications for this visit.        Past Medical History:   Diagnosis Date    Arm weakness 10/23/2021    Cancer ([facility])     SKIN-LABIA    Chronic pain     Complete rotator cuff tear of left shoulder 10/23/2021    Complete tear of right rotator cuff 10/23/2021    Depression with anxiety     Diabetes ([facility])     type 2    GERD (gastroesophageal reflux disease)     High cholesterol     Labial lesion     precancerous, resected    Left shoulder pain 10/23/2021    Multiple sclerosis ([facility])     Psychiatric disorder     DEPRESSION/ ANXIETY    Right shoulder pain 10/23/2021    S/P rotator cuff repair 10/23/2021    Tobacco abuse         Past Surgical History:   Procedure Laterality Date    HX APPENDECTOMY      HX  SECTION      HX CHOLECYSTECTOMY      HX COLONOSCOPY      HX HERNIA REPAIR      HX HYSTERECTOMY      UPPER GI ENDOSCOPY,BIOPSY  2019         UPPER GI ENDOSCOPY,BIOPSY  2021            Family History   Problem Relation Age of Onset    Heart Disease Father     Asthma Father     Diabetes Father     Elevated Lipids Father     Hypertension Father     Alcohol abuse Father     Diabetes Mother     Parkinsonism Mother     COPD Mother     Arthritis-osteo Mother     Asthma Mother     Heart Disease Mother     Elevated Lipids Mother     Hypertension Mother     Breast Cancer Paternal Grandmother         over 48    Depression Sister     Diabetes Sister     Heart Disease Sister     Depression Brother     Alcohol abuse Brother     Anesth Problems Neg Hx         Social History     Socioeconomic History    Marital status:      Spouse name: Not on file    Number of children: Not on file    Years of education: Not on file    Highest education level: Not on file   Occupational History     Employer: NOT EMPLOYED Comment: on disability   Tobacco Use    Smoking status: Current Every Day Smoker     Packs/day: 0.50     Years: 35.00     Pack years: 17.50     Types: Cigarettes    Smokeless tobacco: Never Used   Vaping Use    Vaping Use: Never used   Substance and Sexual Activity    Alcohol use: No    Drug use: No    Sexual activity: Not on file   Other Topics Concern    Not on file   Social History Narrative    Not on file     Social Determinants of Health     Financial Resource Strain:     Difficulty of Paying Living Expenses: Not on file   Food Insecurity:     Worried About Running Out of Food in the Last Year: Not on file    Seth of Food in the Last Year: Not on file   Transportation Needs:     Lack of Transportation (Medical): Not on file    Lack of Transportation (Non-Medical): Not on file   Physical Activity:     Days of Exercise per Week: Not on file    Minutes of Exercise per Session: Not on file   Stress:     Feeling of Stress : Not on file   Social Connections:     Frequency of Communication with Friends and Family: Not on file    Frequency of Social Gatherings with Friends and Family: Not on file    Attends Anabaptism Services: Not on file    Active Member of [address] [place] [address] or Organizations: Not on file    Attends Club or Organization Meetings: Not on file    Marital Status: Not on file   Intimate Partner Violence:     Fear of Current or Ex-Partner: Not on file    Emotionally Abused: Not on file    Physically Abused: Not on file    Sexually Abused: Not on file   Housing Stability:     Unable to Pay for Housing in the Last Year: Not on file    Number of [place] in the Last Year: Not on file    Unstable Housing in the Last Year: Not on file       Review of Systems   Musculoskeletal:        Shoulder post op       Vitals: There were no vitals taken for this visit. There is no height or weight on file to calculate BMI. Ortho Exam     Right shoulder: Portal sites have healed well.   She has no swelling or ecchymosis. Passively I can get her up to 120 degrees of forward elevation, 100 degrees of abduction and 60 degrees of external rotation. Patient has mild pain with the extremes of motion. Patient voluntarily shows elevation of the shoulder when she demonstrates pain when she puts her hair in a hair tie. This is surprising and encouraging given the fact that she is only 7 weeks status post rotator cuff repair and seemingly has good range of motion. ASSESSMENT/PLAN:    At this stage, I would suggest reentering back into physical therapy. She is demonstrating actually range of motion that is better than expected at this stage. Hopefully, the pain will resolve in a timely fashion. I written a new prescription for physical therapy. If she has no improvement or pain worsens, I would suggest an MRI. She is to return to the office in 4 weeks.         Francisco Levi MD

## 2021-12-28 ENCOUNTER — OFFICE VISIT (OUTPATIENT)
Dept: ORTHOPEDIC SURGERY | Age: 66
End: 2021-12-28
Payer: MEDICARE

## 2021-12-28 DIAGNOSIS — Z98.890 STATUS POST RIGHT ROTATOR CUFF REPAIR: Primary | ICD-10-CM

## 2021-12-28 PROCEDURE — G8419 CALC BMI OUT NRM PARAM NOF/U: HCPCS | Performed by: ORTHOPAEDIC SURGERY

## 2021-12-28 PROCEDURE — G8400 PT W/DXA NO RESULTS DOC: HCPCS | Performed by: ORTHOPAEDIC SURGERY

## 2021-12-28 PROCEDURE — G8432 DEP SCR NOT DOC, RNG: HCPCS | Performed by: ORTHOPAEDIC SURGERY

## 2021-12-28 PROCEDURE — G8427 DOCREV CUR MEDS BY ELIG CLIN: HCPCS | Performed by: ORTHOPAEDIC SURGERY

## 2021-12-28 PROCEDURE — 99024 POSTOP FOLLOW-UP VISIT: CPT | Performed by: ORTHOPAEDIC SURGERY

## 2021-12-28 PROCEDURE — 3017F COLORECTAL CA SCREEN DOC REV: CPT | Performed by: ORTHOPAEDIC SURGERY

## 2021-12-28 PROCEDURE — 1101F PT FALLS ASSESS-DOCD LE1/YR: CPT | Performed by: ORTHOPAEDIC SURGERY

## 2021-12-28 PROCEDURE — 1090F PRES/ABSN URINE INCON ASSESS: CPT | Performed by: ORTHOPAEDIC SURGERY

## 2021-12-28 PROCEDURE — G8536 NO DOC ELDER MAL SCRN: HCPCS | Performed by: ORTHOPAEDIC SURGERY

## 2021-12-28 NOTE — PROGRESS NOTES
Dieter Hobbs (: 1955) is a 73 y.o. female, patient, here for evaluation of the following chief complaint(s):  Shoulder Pain (right shoulder pain)       HPI:    Patient returns the office now status post rotator cuff repair of the right. She did have a setback the last time I saw her after she had a fall. She states this is all resolved. She has been feeling very comfortable. Her pain is nearly 0. She has started to use her arm on a regular basis at this stage    Allergies   Allergen Reactions    Pcn [Penicillins] Rash       Current Outpatient Medications   Medication Sig    OneTouch Ultra Test strip USE AS DIRECTED BY PRESCRIBER TWICE DAILY    ketorolac (ACULAR) 0.5 % ophthalmic solution     ofloxacin (FLOXIN) 0.3 % ophthalmic solution INSTILL 1 DROP TO SURGERY EYE FOUR TIMES DAILY. START 2 DAYS BEFORE SURGERY    prednisoLONE acetate (PRED FORTE) 1 % ophthalmic suspension SHAKE LIQUID AND INSTILL 1 DROP IN BOTH EYES FOUR TIMES DAILY    rosuvastatin (Crestor) 40 mg tablet Take 40 mg by mouth nightly.  lipase-protease-amylase (Creon) 12,000-38,000 -60,000 unit capsule Take 3 Caps by mouth three (3) times daily (with meals). Or 3 pills daily    dicyclomine (BENTYL) 20 mg tablet Take 1 Tab by mouth every six (6) hours. (Patient taking differently: Take 20 mg by mouth every six (6) hours as needed.)    lisinopril (PRINIVIL, ZESTRIL) 10 mg tablet Take 20 mg by mouth daily.  insulin glargine U-300 conc (TOUJEO SOLOSTAR U-300 INSULIN) 300 unit/mL (1.5 mL) inpn 44 Units by SubCUTAneous route daily.  FLUoxetine (PROZAC) 20 mg tablet Take 60 mg by mouth daily.  metFORMIN (GLUCOPHAGE) 500 mg tablet Take 500 mg by mouth two (2) times daily (with meals).  gabapentin (NEURONTIN) 400 mg capsule Take 600 mg by mouth two (2) times a day.  clonazepam (KLONOPIN) 2 mg tablet Take 4 mg by mouth nightly. No current facility-administered medications for this visit.        Past Medical History: Diagnosis Date    Arm weakness 10/23/2021    Cancer ([facility])     SKIN-LABIA    Chronic pain     Complete rotator cuff tear of left shoulder 10/23/2021    Complete tear of right rotator cuff 10/23/2021    Depression with anxiety     Diabetes ([facility])     type 2    GERD (gastroesophageal reflux disease)     High cholesterol     Labial lesion     precancerous, resected    Left shoulder pain 10/23/2021    Multiple sclerosis ([facility])     Psychiatric disorder     DEPRESSION/ ANXIETY    Right shoulder pain 10/23/2021    S/P rotator cuff repair 10/23/2021    Tobacco abuse         Past Surgical History:   Procedure Laterality Date    HX APPENDECTOMY      HX  SECTION      HX CHOLECYSTECTOMY      HX COLONOSCOPY      HX HERNIA REPAIR      HX HYSTERECTOMY      UPPER GI ENDOSCOPY,BIOPSY  2019         UPPER GI ENDOSCOPY,BIOPSY  2021            Family History   Problem Relation Age of Onset    Heart Disease Father     Asthma Father     Diabetes Father     Elevated Lipids Father     Hypertension Father     Alcohol abuse Father     Diabetes Mother     Parkinsonism Mother     COPD Mother     OSTEOARTHRITIS Mother     Asthma Mother     Heart Disease Mother     Elevated Lipids Mother     Hypertension Mother     Breast Cancer Paternal Grandmother         over 48    Depression Sister     Diabetes Sister     Heart Disease Sister     Depression Brother     Alcohol abuse Brother     Anesth Problems Neg Hx         Social History     Socioeconomic History    Marital status:      Spouse name: Not on file    Number of children: Not on file    Years of education: Not on file    Highest education level: Not on file   Occupational History     Employer: NOT EMPLOYED     Comment: on disability   Tobacco Use    Smoking status: Current Every Day Smoker     Packs/day: 0.50     Years: 35.00     Pack years: 17.50     Types: Cigarettes    Smokeless tobacco: Never Used   Vaping Use    Vaping Use: Never used   Substance and Sexual Activity    Alcohol use: No    Drug use: No    Sexual activity: Not on file   Other Topics Concern    Not on file   Social History Narrative    Not on file     Social Determinants of Health     Financial Resource Strain:     Difficulty of Paying Living Expenses: Not on file   Food Insecurity:     Worried About Running Out of Food in the Last Year: Not on file    Seth of Food in the Last Year: Not on file   Transportation Needs:     Lack of Transportation (Medical): Not on file    Lack of Transportation (Non-Medical): Not on file   Physical Activity:     Days of Exercise per Week: Not on file    Minutes of Exercise per Session: Not on file   Stress:     Feeling of Stress : Not on file   Social Connections:     Frequency of Communication with Friends and Family: Not on file    Frequency of Social Gatherings with Friends and Family: Not on file    Attends Pent[employer]ost[employer]ism Services: Not on file    Active Member of Clubs or Organizations: Not on file    Attends Club or Organization Meetings: Not on file    Marital Status: Not on file   Intimate Partner Violence:     Fear of Current or Ex-Partner: Not on file    Emotionally Abused: Not on file    Physically Abused: Not on file    Sexually Abused: Not on file   Housing Stability:     Unable to Pay for Housing in the Last Year: Not on file    Number of [place] in the Last Year: Not on file    Unstable Housing in the Last Year: Not on file       Review of Systems   Musculoskeletal:        Right shoulder pain         Vitals: There were no vitals taken for this visit. There is no height or weight on file to calculate BMI. Ortho Exam     Right shoulder: Patient has near full range of motion of her shoulder without crepitation or pain. Her strength is maintained throughout. There is no swelling noted distally. Neurovascular examination is intact.     ASSESSMENT/PLAN:    Patient has full range of motion and minimal pain. I think at this stage, it be reasonable to advance out of structured physical therapy and continue with a physician directed home exercise program.  Patient agrees with this.   She is to return to the office as needed        Miguel Angel Valle MD

## 2022-03-17 ENCOUNTER — HOSPITAL ENCOUNTER (EMERGENCY)
Age: 67
Discharge: HOME | End: 2022-03-17
Attending: EMERGENCY MEDICINE
Payer: MEDICARE

## 2022-03-17 ENCOUNTER — APPOINTMENT (OUTPATIENT)
Dept: CT IMAGING | Age: 67
End: 2022-03-17
Attending: EMERGENCY MEDICINE
Payer: MEDICARE

## 2022-03-17 VITALS
RESPIRATION RATE: 17 BRPM | HEIGHT: 69 IN | TEMPERATURE: 98.7 F | WEIGHT: 180 LBS | SYSTOLIC BLOOD PRESSURE: 149 MMHG | OXYGEN SATURATION: 96 % | BODY MASS INDEX: 26.66 KG/M2 | HEART RATE: 70 BPM | DIASTOLIC BLOOD PRESSURE: 84 MMHG

## 2022-03-17 DIAGNOSIS — N30.90 CYSTITIS: Primary | ICD-10-CM

## 2022-03-17 LAB
ALBUMIN SERPL-MCNC: 3.8 G/DL (ref 3.5–5)
ALBUMIN/GLOB SERPL: 1 {RATIO} (ref 1.1–2.2)
ALP SERPL-CCNC: 74 U/L (ref 45–117)
ALT SERPL-CCNC: 23 U/L (ref 12–78)
ANION GAP SERPL CALC-SCNC: 5 MMOL/L (ref 5–15)
APPEARANCE UR: ABNORMAL
AST SERPL-CCNC: 18 U/L (ref 15–37)
BACTERIA URNS QL MICRO: NEGATIVE /HPF
BASOPHILS # BLD: 0 K/UL (ref 0–0.1)
BASOPHILS NFR BLD: 0 % (ref 0–1)
BILIRUB SERPL-MCNC: 0.4 MG/DL (ref 0.2–1)
BILIRUB UR QL: NEGATIVE
BUN SERPL-MCNC: 11 MG/DL (ref 6–20)
BUN/CREAT SERPL: 14 (ref 12–20)
CALCIUM SERPL-MCNC: 9.5 MG/DL (ref 8.5–10.1)
CHLORIDE SERPL-SCNC: 107 MMOL/L (ref 97–108)
CO2 SERPL-SCNC: 26 MMOL/L (ref 21–32)
COLOR UR: ABNORMAL
COMMENT, HOLDF: NORMAL
CREAT SERPL-MCNC: 0.76 MG/DL (ref 0.55–1.02)
DIFFERENTIAL METHOD BLD: NORMAL
EOSINOPHIL # BLD: 0.1 K/UL (ref 0–0.4)
EOSINOPHIL NFR BLD: 2 % (ref 0–7)
EPITH CASTS URNS QL MICRO: ABNORMAL /LPF
ERYTHROCYTE [DISTWIDTH] IN BLOOD BY AUTOMATED COUNT: 13.6 % (ref 11.5–14.5)
GLOBULIN SER CALC-MCNC: 3.9 G/DL (ref 2–4)
GLUCOSE SERPL-MCNC: 108 MG/DL (ref 65–100)
GLUCOSE UR STRIP.AUTO-MCNC: NEGATIVE MG/DL
HCT VFR BLD AUTO: 39.8 % (ref 35–47)
HGB BLD-MCNC: 12.6 G/DL (ref 11.5–16)
HGB UR QL STRIP: ABNORMAL
HYALINE CASTS URNS QL MICRO: ABNORMAL /LPF (ref 0–5)
IMM GRANULOCYTES # BLD AUTO: 0 K/UL (ref 0–0.04)
IMM GRANULOCYTES NFR BLD AUTO: 0 % (ref 0–0.5)
KETONES UR QL STRIP.AUTO: NEGATIVE MG/DL
LEUKOCYTE ESTERASE UR QL STRIP.AUTO: ABNORMAL
LYMPHOCYTES # BLD: 2.3 K/UL (ref 0.8–3.5)
LYMPHOCYTES NFR BLD: 32 % (ref 12–49)
MCH RBC QN AUTO: 27.4 PG (ref 26–34)
MCHC RBC AUTO-ENTMCNC: 31.7 G/DL (ref 30–36.5)
MCV RBC AUTO: 86.5 FL (ref 80–99)
MONOCYTES # BLD: 0.4 K/UL (ref 0–1)
MONOCYTES NFR BLD: 5 % (ref 5–13)
NEUTS SEG # BLD: 4.2 K/UL (ref 1.8–8)
NEUTS SEG NFR BLD: 61 % (ref 32–75)
NITRITE UR QL STRIP.AUTO: NEGATIVE
NRBC # BLD: 0 K/UL (ref 0–0.01)
NRBC BLD-RTO: 0 PER 100 WBC
PH UR STRIP: 6.5 [PH] (ref 5–8)
PLATELET # BLD AUTO: 367 K/UL (ref 150–400)
PMV BLD AUTO: 9.2 FL (ref 8.9–12.9)
POTASSIUM SERPL-SCNC: 4.6 MMOL/L (ref 3.5–5.1)
PROT SERPL-MCNC: 7.7 G/DL (ref 6.4–8.2)
PROT UR STRIP-MCNC: NEGATIVE MG/DL
RBC # BLD AUTO: 4.6 M/UL (ref 3.8–5.2)
RBC #/AREA URNS HPF: ABNORMAL /HPF (ref 0–5)
SAMPLES BEING HELD,HOLD: NORMAL
SODIUM SERPL-SCNC: 138 MMOL/L (ref 136–145)
SP GR UR REFRACTOMETRY: 1.01 (ref 1–1.03)
UA: UC IF INDICATED,UAUC: ABNORMAL
UROBILINOGEN UR QL STRIP.AUTO: 0.2 EU/DL (ref 0.2–1)
WBC # BLD AUTO: 7 K/UL (ref 3.6–11)
WBC URNS QL MICRO: ABNORMAL /HPF (ref 0–4)

## 2022-03-17 PROCEDURE — 96361 HYDRATE IV INFUSION ADD-ON: CPT

## 2022-03-17 PROCEDURE — 74011250636 HC RX REV CODE- 250/636: Performed by: EMERGENCY MEDICINE

## 2022-03-17 PROCEDURE — 74011000258 HC RX REV CODE- 258: Performed by: EMERGENCY MEDICINE

## 2022-03-17 PROCEDURE — 74011250637 HC RX REV CODE- 250/637: Performed by: EMERGENCY MEDICINE

## 2022-03-17 PROCEDURE — 85025 COMPLETE CBC W/AUTO DIFF WBC: CPT

## 2022-03-17 PROCEDURE — 87086 URINE CULTURE/COLONY COUNT: CPT

## 2022-03-17 PROCEDURE — 96365 THER/PROPH/DIAG IV INF INIT: CPT

## 2022-03-17 PROCEDURE — 96375 TX/PRO/DX INJ NEW DRUG ADDON: CPT

## 2022-03-17 PROCEDURE — 74176 CT ABD & PELVIS W/O CONTRAST: CPT

## 2022-03-17 PROCEDURE — 36415 COLL VENOUS BLD VENIPUNCTURE: CPT

## 2022-03-17 PROCEDURE — 80053 COMPREHEN METABOLIC PANEL: CPT

## 2022-03-17 PROCEDURE — 81001 URINALYSIS AUTO W/SCOPE: CPT

## 2022-03-17 PROCEDURE — 99284 EMERGENCY DEPT VISIT MOD MDM: CPT

## 2022-03-17 RX ORDER — CEFDINIR 300 MG/1
300 CAPSULE ORAL 2 TIMES DAILY
Qty: 14 CAPSULE | Refills: 0 | Status: SHIPPED | OUTPATIENT
Start: 2022-03-17 | End: 2022-03-24

## 2022-03-17 RX ORDER — HYDROCODONE BITARTRATE AND ACETAMINOPHEN 5; 325 MG/1; MG/1
1 TABLET ORAL ONCE
Status: COMPLETED | OUTPATIENT
Start: 2022-03-17 | End: 2022-03-17

## 2022-03-17 RX ORDER — KETOROLAC TROMETHAMINE 30 MG/ML
15 INJECTION, SOLUTION INTRAMUSCULAR; INTRAVENOUS
Status: COMPLETED | OUTPATIENT
Start: 2022-03-17 | End: 2022-03-17

## 2022-03-17 RX ORDER — ACETAMINOPHEN 325 MG/1
650 TABLET ORAL ONCE
Status: COMPLETED | OUTPATIENT
Start: 2022-03-17 | End: 2022-03-17

## 2022-03-17 RX ADMIN — SODIUM CHLORIDE 1000 ML: 9 INJECTION, SOLUTION INTRAVENOUS at 10:19

## 2022-03-17 RX ADMIN — KETOROLAC TROMETHAMINE 15 MG: 30 INJECTION, SOLUTION INTRAMUSCULAR; INTRAVENOUS at 10:18

## 2022-03-17 RX ADMIN — CEFTRIAXONE 1 G: 1 INJECTION, POWDER, FOR SOLUTION INTRAMUSCULAR; INTRAVENOUS at 14:05

## 2022-03-17 RX ADMIN — ACETAMINOPHEN 325MG 650 MG: 325 TABLET ORAL at 14:04

## 2022-03-17 RX ADMIN — HYDROCODONE BITARTRATE AND ACETAMINOPHEN 1 TABLET: 5; 325 TABLET ORAL at 14:04

## 2022-03-17 NOTE — ED PROVIDER NOTES
EMERGENCY DEPARTMENT HISTORY AND PHYSICAL EXAM      Date: 3/17/2022  Patient Name: Aziza Coburn  Patient Age and Sex: 73 y.o. female     History of Presenting Illness     Chief Complaint   Patient presents with    Incontinence     Pt arrives via wheelchair to triage with CC of incontinence, L flank pain, dysuria, hematuria (yesterday) x 4 days. Patient reports that she is not typically incontinent. Patient reports concern for kidney stone. Reports nausea and chills    Flank Pain     History Provided By: Patient    HPI: Aziza Coburn is a 43-year-old history MS, diabetes presenting with urinary symptoms. She reports 4 to 5 days of urinary frequency, burning dysuria, hematuria. Is been worsening to the point where she is having such bad urinary urgency she is unable to get to the bathroom in time. She feels urine coming however, so frequently again she cannot make it. She reports associated nausea chills. She reports left flank pain rating to her groin which is constant, worse with ambulation, does not come in waves. She has a history of cystitis however no history of pyelonephritis or kidney stones. No new weakness or numbness, no saddle anesthesia or back pain. There are no other complaints, changes, or physical findings at this time. PCP: Yovana Johnson MD    No current facility-administered medications on file prior to encounter. Current Outpatient Medications on File Prior to Encounter   Medication Sig Dispense Refill    OneTouch Ultra Test strip USE AS DIRECTED BY PRESCRIBER TWICE DAILY      ketorolac (ACULAR) 0.5 % ophthalmic solution       ofloxacin (FLOXIN) 0.3 % ophthalmic solution INSTILL 1 DROP TO SURGERY EYE FOUR TIMES DAILY. START 2 DAYS BEFORE SURGERY      prednisoLONE acetate (PRED FORTE) 1 % ophthalmic suspension SHAKE LIQUID AND INSTILL 1 DROP IN BOTH EYES FOUR TIMES DAILY      rosuvastatin (Crestor) 40 mg tablet Take 40 mg by mouth nightly.       lipase-protease-amylase (Creon) 12,000-38,000 -60,000 unit capsule Take 3 Caps by mouth three (3) times daily (with meals). Or 3 pills daily      dicyclomine (BENTYL) 20 mg tablet Take 1 Tab by mouth every six (6) hours. (Patient taking differently: Take 20 mg by mouth every six (6) hours as needed.) 10 Tab 0    lisinopril (PRINIVIL, ZESTRIL) 10 mg tablet Take 20 mg by mouth daily.  insulin glargine U-300 conc (TOUJEO SOLOSTAR U-300 INSULIN) 300 unit/mL (1.5 mL) inpn 44 Units by SubCUTAneous route daily.  FLUoxetine (PROZAC) 20 mg tablet Take 60 mg by mouth daily.  metFORMIN (GLUCOPHAGE) 500 mg tablet Take 500 mg by mouth two (2) times daily (with meals).  gabapentin (NEURONTIN) 400 mg capsule Take 600 mg by mouth two (2) times a day.  clonazepam (KLONOPIN) 2 mg tablet Take 4 mg by mouth nightly.          Past History     Past Medical History:  Past Medical History:   Diagnosis Date    Arm weakness 10/23/2021    Cancer (HCC)     SKIN-LABIA    Chronic pain     Complete rotator cuff tear of left shoulder 10/23/2021    Complete tear of right rotator cuff 10/23/2021    Depression with anxiety     Diabetes (HCC)     type 2    GERD (gastroesophageal reflux disease)     High cholesterol     Labial lesion     precancerous, resected    Left shoulder pain 10/23/2021    Multiple sclerosis ([address]     Psychiatric disorder     DEPRESSION/ ANXIETY    Right shoulder pain 10/23/2021    S/P rotator cuff repair 10/23/2021    Tobacco abuse        Past Surgical History:  Past Surgical History:   Procedure Laterality Date    HX APPENDECTOMY      HX  SECTION      HX CHOLECYSTECTOMY      HX COLONOSCOPY      HX HERNIA REPAIR      HX HYSTERECTOMY      UPPER GI ENDOSCOPY,BIOPSY  2019         UPPER GI ENDOSCOPY,BIOPSY  2021            Family History:  Family History   Problem Relation Age of Onset    Heart Disease Father     Asthma Father     Diabetes Father     Elevated Lipids Father     Hypertension Father     Alcohol abuse Father     Diabetes Mother     Parkinsonism Mother     COPD Mother     OSTEOARTHRITIS Mother     Asthma Mother     Heart Disease Mother     Elevated Lipids Mother     Hypertension Mother     Breast Cancer Paternal Grandmother         over 48    Depression Sister     Diabetes Sister     Heart Disease Sister     Depression Brother     Alcohol abuse Brother     Anesth Problems Neg Hx        Social History:  Social History     Tobacco Use    Smoking status: Current Every Day Smoker     Packs/day: 0.50     Years: 35.00     Pack years: 17.50     Types: Cigarettes    Smokeless tobacco: Never Used   Vaping Use    Vaping Use: Never used   Substance Use Topics    Alcohol use: No    Drug use: No       Allergies: Allergies   Allergen Reactions    Pcn [Penicillins] Rash         Review of Systems   Review of Systems   Constitutional: Positive for chills. Negative for fever. HENT: Negative for congestion and rhinorrhea. Respiratory: Negative for shortness of breath. Cardiovascular: Negative for chest pain. Gastrointestinal: Positive for nausea. Negative for abdominal pain and vomiting. Genitourinary: Positive for dysuria, flank pain, frequency, hematuria and urgency. Musculoskeletal: Negative for myalgias. All other systems reviewed and are negative. Physical Exam   Physical Exam  Vitals and nursing note reviewed. Constitutional:       General: She is not in acute distress. Appearance: Normal appearance. She is not ill-appearing. Comments: Appears uncomfortable   HENT:      Head: Normocephalic. Mouth/Throat:      Mouth: Mucous membranes are moist.   Eyes:      Conjunctiva/sclera: Conjunctivae normal.   Cardiovascular:      Rate and Rhythm: Normal rate and regular rhythm. Pulses: Normal pulses. Pulmonary:      Effort: Pulmonary effort is normal.      Breath sounds: Normal breath sounds.    Abdominal: General: Abdomen is flat. Palpations: Abdomen is soft. Tenderness: There is no abdominal tenderness. There is left CVA tenderness. Musculoskeletal:         General: No deformity. Skin:     General: Skin is warm and dry. Neurological:      Mental Status: She is alert and oriented to person, place, and time. Mental status is at baseline. Psychiatric:         Behavior: Behavior normal.         Thought Content: Thought content normal.        Diagnostic Study Results     Labs -     Recent Results (from the past 12 hour(s))   CBC WITH AUTOMATED DIFF    Collection Time: 03/17/22 10:12 AM   Result Value Ref Range    WBC 7.0 3.6 - 11.0 K/uL    RBC 4.60 3.80 - 5.20 M/uL    HGB 12.6 11.5 - 16.0 g/dL    HCT 39.8 35.0 - 47.0 %    MCV 86.5 80.0 - 99.0 FL    MCH 27.4 26.0 - 34.0 PG    MCHC 31.7 30.0 - 36.5 g/dL    RDW 13.6 11.5 - 14.5 %    PLATELET 825 255 - 826 K/uL    MPV 9.2 8.9 - 12.9 FL    NRBC 0.0 0  WBC    ABSOLUTE NRBC 0.00 0.00 - 0.01 K/uL    NEUTROPHILS 61 32 - 75 %    LYMPHOCYTES 32 12 - 49 %    MONOCYTES 5 5 - 13 %    EOSINOPHILS 2 0 - 7 %    BASOPHILS 0 0 - 1 %    IMMATURE GRANULOCYTES 0 0.0 - 0.5 %    ABS. NEUTROPHILS 4.2 1.8 - 8.0 K/UL    ABS. LYMPHOCYTES 2.3 0.8 - 3.5 K/UL    ABS. MONOCYTES 0.4 0.0 - 1.0 K/UL    ABS. EOSINOPHILS 0.1 0.0 - 0.4 K/UL    ABS. BASOPHILS 0.0 0.0 - 0.1 K/UL    ABS. IMM.  GRANS. 0.0 0.00 - 0.04 K/UL    DF AUTOMATED     METABOLIC PANEL, COMPREHENSIVE    Collection Time: 03/17/22 10:12 AM   Result Value Ref Range    Sodium 138 136 - 145 mmol/L    Potassium 4.6 3.5 - 5.1 mmol/L    Chloride 107 97 - 108 mmol/L    CO2 26 21 - 32 mmol/L    Anion gap 5 5 - 15 mmol/L    Glucose 108 (H) 65 - 100 mg/dL    BUN 11 6 - 20 MG/DL    Creatinine 0.76 0.55 - 1.02 MG/DL    BUN/Creatinine ratio 14 12 - 20      GFR est AA >60 >60 ml/min/1.73m2    GFR est non-AA >60 >60 ml/min/1.73m2    Calcium 9.5 8.5 - 10.1 MG/DL    Bilirubin, total 0.4 0.2 - 1.0 MG/DL    ALT (SGPT) 23 12 - 78 U/L AST (SGOT) 18 15 - 37 U/L    Alk. phosphatase 74 45 - 117 U/L    Protein, total 7.7 6.4 - 8.2 g/dL    Albumin 3.8 3.5 - 5.0 g/dL    Globulin 3.9 2.0 - 4.0 g/dL    A-G Ratio 1.0 (L) 1.1 - 2.2     SAMPLES BEING HELD    Collection Time: 03/17/22 10:12 AM   Result Value Ref Range    SAMPLES BEING HELD  PST     COMMENT        Add-on orders for these samples will be processed based on acceptable specimen integrity and analyte stability, which may vary by analyte. URINALYSIS W/ REFLEX CULTURE    Collection Time: 03/17/22 12:03 PM    Specimen: Urine   Result Value Ref Range    Color YELLOW/STRAW      Appearance CLOUDY (A) CLEAR      Specific gravity 1.006 1.003 - 1.030      pH (UA) 6.5 5.0 - 8.0      Protein Negative NEG mg/dL    Glucose Negative NEG mg/dL    Ketone Negative NEG mg/dL    Bilirubin Negative NEG      Blood MODERATE (A) NEG      Urobilinogen 0.2 0.2 - 1.0 EU/dL    Nitrites Negative NEG      Leukocyte Esterase LARGE (A) NEG      WBC 20-50 0 - 4 /hpf    RBC 0-5 0 - 5 /hpf    Epithelial cells FEW FEW /lpf    Bacteria Negative NEG /hpf    UA:UC IF INDICATED URINE CULTURE ORDERED (A) CNI      Hyaline cast 0-2 0 - 5 /lpf     Radiologic Studies   CT ABD PELV WO CONT   Final Result   No renal or ureteral stone or evidence of hydronephrosis. No acute abnormality. Status post cholecystectomy, hysterectomy, and appendectomy. CT Results  (Last 48 hours)               03/17/22 1030  CT ABD PELV WO CONT Final result    Impression:  No renal or ureteral stone or evidence of hydronephrosis. No acute abnormality. Status post cholecystectomy, hysterectomy, and appendectomy. Narrative:  EXAM: CT ABD PELV WO CONT       INDICATION: Left flank and lower abdominal pain for 4 days       COMPARISON: 2/13/2021       IV CONTRAST: None. ORAL CONTRAST: None       TECHNIQUE:    Thin axial images were obtained through the abdomen and pelvis. Coronal and   sagittal reformats were generated.  CT dose reduction was achieved through use of   a standardized protocol tailored for this examination and automatic exposure   control for dose modulation. The absence of intravenous contrast material reduces the sensitivity for   evaluation of the vasculature and solid organs. FINDINGS:    LOWER THORAX: No significant abnormality in the incidentally imaged lower chest.   LIVER: No mass. BILIARY TREE: Status post cholecystectomy. CBD is not dilated. SPLEEN: within normal limits. PANCREAS: No focal abnormality. ADRENALS: Unremarkable. KIDNEYS/URETERS: No calculus or hydronephrosis. STOMACH: Unremarkable. SMALL BOWEL: No dilatation or wall thickening. COLON: No dilatation or wall thickening. APPENDIX: Surgically absent. PERITONEUM: No ascites or pneumoperitoneum. RETROPERITONEUM: No lymphadenopathy or aortic aneurysm. REPRODUCTIVE ORGANS: The uterus is surgically absent. URINARY BLADDER: No mass or calculus. BONES: Degenerative changes are seen in the lumbar spine and hip joints   bilaterally. ABDOMINAL WALL: No mass or hernia. ADDITIONAL COMMENTS: N/A               CXR Results  (Last 48 hours)    None            Medical Decision Making   I am the first provider for this patient. I reviewed the vital signs, available nursing notes, past medical history, past surgical history, family history and social history. Vital Signs-Reviewed the patient's vital signs.   Patient Vitals for the past 12 hrs:   Temp Pulse Resp BP SpO2   03/17/22 1500     96 %   03/17/22 1445     92 %   03/17/22 1430     93 %   03/17/22 1415     96 %   03/17/22 1345  70 17 (!) 149/84 94 %   03/17/22 1330  66 17 135/72 94 %   03/17/22 1315  64 16 138/77 94 %   03/17/22 1259  71 17 123/71 96 %   03/17/22 1159  67 19 123/72 95 %   03/17/22 1048  (!) 59 22 122/67 97 %   03/17/22 0952 98.7 °F (37.1 °C) 69 16 (!) 125/98 99 %       Records Reviewed: Nursing Notes and Old Medical Records    Provider Notes (Medical Decision Making):   Differential includes pyelonephritis, infected stone, simple nephrolithiasis    Will obtain CBC to risk stratify, check for leukocytosis anemia in setting of hematuria. Obtain CMP to evaluate for renal dysfunction, electrolyte abnormality. Urinalysis to evaluate for the above, CT without contrast to evaluate for the above. ED Course:   Initial assessment performed. The patients presenting problems have been discussed, and they are in agreement with the care plan formulated and outlined with them. I have encouraged them to ask questions as they arise throughout their visit. ED Course as of 03/17/22 1728   Thu Mar 17, 2022   1039 CBC normal counts normal differential [WB]   1058 CT demonstrates no stone no hydronephrosis status post cholecystectomy hysterectomy and appendectomy [WB]   1137 Able to urinate, CMP is resulted and is normal, CT also without abnormality. [WB]   1137 Will perform post void residual, she has received IV fluid, IV Toradol [WB]   1324 Post void residual 50 ml [WB]      ED Course User Index  [WB] Akbar Bender MD     Disposition:  Discharge Note:  The patient has been re-evaluated and is ready for discharge. Reviewed available results with patient. Counseled patient on diagnosis and care plan. Patient has expressed understanding, and all questions have been answered. Patient agrees with plan and agrees to follow up as recommended, or to return to the ED if their symptoms worsen. Discharge instructions have been provided and explained to the patient, along with reasons to return to the ED. PLAN:  Discharge Medication List as of 3/17/2022  1:31 PM      START taking these medications    Details   cefdinir (OMNICEF) 300 mg capsule Take 1 Capsule by mouth two (2) times a day for 7 days. , Normal, Disp-14 Capsule, R-0         CONTINUE these medications which have NOT CHANGED    Details   OneTouch Ultra Test strip USE AS DIRECTED BY PRESCRIBER TWICE DAILY, Historical Med, KP      ketorolac (ACULAR) 0.5 % ophthalmic solution Historical Med      ofloxacin (FLOXIN) 0.3 % ophthalmic solution INSTILL 1 DROP TO SURGERY EYE FOUR TIMES DAILY. START 2 DAYS BEFORE SURGERY, Historical Med      prednisoLONE acetate (PRED FORTE) 1 % ophthalmic suspension SHAKE LIQUID AND INSTILL 1 DROP IN BOTH EYES FOUR TIMES DAILY, Historical Med      rosuvastatin (Crestor) 40 mg tablet Take 40 mg by mouth nightly., Historical Med      lipase-protease-amylase (Creon) 12,000-38,000 -60,000 unit capsule Take 3 Caps by mouth three (3) times daily (with meals). Or 3 pills daily, Historical Med      dicyclomine (BENTYL) 20 mg tablet Take 1 Tab by mouth every six (6) hours. , Print, Disp-10 Tab, R-0      lisinopril (PRINIVIL, ZESTRIL) 10 mg tablet Take 20 mg by mouth daily. , Historical Med      insulin glargine U-300 conc (TOUJEO SOLOSTAR U-300 INSULIN) 300 unit/mL (1.5 mL) inpn 44 Units by SubCUTAneous route daily. , Historical Med      FLUoxetine (PROZAC) 20 mg tablet Take 60 mg by mouth daily. , Historical Med      metFORMIN (GLUCOPHAGE) 500 mg tablet Take 500 mg by mouth two (2) times daily (with meals). , Historical Med      gabapentin (NEURONTIN) 400 mg capsule Take 600 mg by mouth two (2) times a day., Historical Med      clonazepam (KLONOPIN) 2 mg tablet Take 4 mg by mouth nightly., Historical Med           2. Follow-up Information     Follow up With Specialties Details Why Contact Info    Eriberto Biswas MD [facility] Medicine Schedule an appointment as soon as possible for a visit    Roula Jordan [address] [place] [address]  [phone]          3. Return to ED if worse     Diagnosis     Clinical Impression:   1. Cystitis        Attestations:    Kimber South M.D. Please note that this dictation was completed with Triton, the Vetr voice recognition software.   Quite often unanticipated grammatical, syntax, homophones, and other interpretive errors are inadvertently transcribed by the computer software. Please disregard these errors. Please excuse any errors that have escaped final proofreading. Thank you.

## 2022-03-18 PROBLEM — M75.122 COMPLETE ROTATOR CUFF TEAR OF LEFT SHOULDER: Status: ACTIVE | Noted: 2021-10-23

## 2022-03-18 PROBLEM — M25.511 RIGHT SHOULDER PAIN: Status: ACTIVE | Noted: 2021-10-23

## 2022-03-18 LAB
BACTERIA SPEC CULT: ABNORMAL
CC UR VC: ABNORMAL
SERVICE CMNT-IMP: ABNORMAL

## 2022-03-19 PROBLEM — M25.512 LEFT SHOULDER PAIN: Status: ACTIVE | Noted: 2021-10-23

## 2022-03-19 PROBLEM — R10.9 ABDOMINAL PAIN: Status: ACTIVE | Noted: 2019-04-25

## 2022-03-19 PROBLEM — M75.121 COMPLETE TEAR OF RIGHT ROTATOR CUFF: Status: ACTIVE | Noted: 2021-10-23

## 2022-03-20 PROBLEM — R29.898 ARM WEAKNESS: Status: ACTIVE | Noted: 2021-10-23

## 2022-03-20 PROBLEM — Z98.890 S/P ROTATOR CUFF REPAIR: Status: ACTIVE | Noted: 2021-10-23

## 2022-11-07 ENCOUNTER — APPOINTMENT (OUTPATIENT)
Dept: GENERAL RADIOLOGY | Age: 67
End: 2022-11-07
Attending: STUDENT IN AN ORGANIZED HEALTH CARE EDUCATION/TRAINING PROGRAM
Payer: MEDICARE

## 2022-11-07 ENCOUNTER — HOSPITAL ENCOUNTER (EMERGENCY)
Age: 67
Discharge: HOME | End: 2022-11-07
Attending: STUDENT IN AN ORGANIZED HEALTH CARE EDUCATION/TRAINING PROGRAM
Payer: MEDICARE

## 2022-11-07 VITALS
HEART RATE: 63 BPM | HEIGHT: 69 IN | DIASTOLIC BLOOD PRESSURE: 75 MMHG | BODY MASS INDEX: 25.18 KG/M2 | WEIGHT: 170 LBS | TEMPERATURE: 98.1 F | RESPIRATION RATE: 18 BRPM | OXYGEN SATURATION: 98 % | SYSTOLIC BLOOD PRESSURE: 114 MMHG

## 2022-11-07 DIAGNOSIS — U07.1 COVID: Primary | ICD-10-CM

## 2022-11-07 LAB
ALBUMIN SERPL-MCNC: 3.7 G/DL (ref 3.5–5)
ALBUMIN/GLOB SERPL: 1 {RATIO} (ref 1.1–2.2)
ALP SERPL-CCNC: 72 U/L (ref 45–117)
ALT SERPL-CCNC: 24 U/L (ref 12–78)
ANION GAP SERPL CALC-SCNC: 11 MMOL/L (ref 5–15)
AST SERPL-CCNC: 18 U/L (ref 15–37)
ATRIAL RATE: 83 BPM
BASE DEFICIT BLD-SCNC: 0.5 MMOL/L
BASOPHILS # BLD: 0 K/UL (ref 0–0.1)
BASOPHILS NFR BLD: 0 % (ref 0–1)
BILIRUB SERPL-MCNC: 0.4 MG/DL (ref 0.2–1)
BNP SERPL-MCNC: 28 PG/ML
BUN SERPL-MCNC: 11 MG/DL (ref 6–20)
BUN/CREAT SERPL: 12 (ref 12–20)
CA-I BLD-MCNC: 1.06 MMOL/L (ref 1.12–1.32)
CALCIUM SERPL-MCNC: 9.5 MG/DL (ref 8.5–10.1)
CALCULATED P AXIS, ECG09: 80 DEGREES
CALCULATED R AXIS, ECG10: 88 DEGREES
CALCULATED T AXIS, ECG11: 90 DEGREES
CHLORIDE BLD-SCNC: 107 MMOL/L (ref 100–108)
CHLORIDE SERPL-SCNC: 104 MMOL/L (ref 97–108)
CO2 BLD-SCNC: 18 MMOL/L (ref 19–24)
CO2 SERPL-SCNC: 22 MMOL/L (ref 21–32)
CREAT SERPL-MCNC: 0.89 MG/DL (ref 0.55–1.02)
CREAT UR-MCNC: 0.7 MG/DL (ref 0.6–1.3)
DIAGNOSIS, 93000: NORMAL
DIFFERENTIAL METHOD BLD: ABNORMAL
EOSINOPHIL # BLD: 0.1 K/UL (ref 0–0.4)
EOSINOPHIL NFR BLD: 1 % (ref 0–7)
ERYTHROCYTE [DISTWIDTH] IN BLOOD BY AUTOMATED COUNT: 13.6 % (ref 11.5–14.5)
GLOBULIN SER CALC-MCNC: 3.7 G/DL (ref 2–4)
GLUCOSE BLD STRIP.AUTO-MCNC: 148 MG/DL (ref 74–106)
GLUCOSE SERPL-MCNC: 151 MG/DL (ref 65–100)
HCO3 BLDA-SCNC: 19 MMOL/L
HCT VFR BLD AUTO: 40.1 % (ref 35–47)
HGB BLD-MCNC: 12.8 G/DL (ref 11.5–16)
IMM GRANULOCYTES # BLD AUTO: 0 K/UL (ref 0–0.04)
IMM GRANULOCYTES NFR BLD AUTO: 0 % (ref 0–0.5)
LACTATE BLD-SCNC: 1.6 MMOL/L (ref 0.4–2)
LYMPHOCYTES # BLD: 2.4 K/UL (ref 0.8–3.5)
LYMPHOCYTES NFR BLD: 36 % (ref 12–49)
MCH RBC QN AUTO: 27.4 PG (ref 26–34)
MCHC RBC AUTO-ENTMCNC: 31.9 G/DL (ref 30–36.5)
MCV RBC AUTO: 85.9 FL (ref 80–99)
MONOCYTES # BLD: 0.5 K/UL (ref 0–1)
MONOCYTES NFR BLD: 7 % (ref 5–13)
NEUTS SEG # BLD: 3.7 K/UL (ref 1.8–8)
NEUTS SEG NFR BLD: 56 % (ref 32–75)
NRBC # BLD: 0 K/UL (ref 0–0.01)
NRBC BLD-RTO: 0 PER 100 WBC
P-R INTERVAL, ECG05: 172 MS
PCO2 BLDV: 20.3 MMHG (ref 41–51)
PH BLDV: 7.58 [PH] (ref 7.32–7.42)
PLATELET # BLD AUTO: 323 K/UL (ref 150–400)
PMV BLD AUTO: 8.8 FL (ref 8.9–12.9)
PO2 BLDV: 27 MMHG (ref 25–40)
POTASSIUM BLD-SCNC: 4.4 MMOL/L (ref 3.5–5.5)
POTASSIUM SERPL-SCNC: 4.3 MMOL/L (ref 3.5–5.1)
PROT SERPL-MCNC: 7.4 G/DL (ref 6.4–8.2)
Q-T INTERVAL, ECG07: 366 MS
QRS DURATION, ECG06: 78 MS
QTC CALCULATION (BEZET), ECG08: 430 MS
RBC # BLD AUTO: 4.67 M/UL (ref 3.8–5.2)
SERVICE CMNT-IMP: ABNORMAL
SODIUM BLD-SCNC: 138 MMOL/L (ref 136–145)
SODIUM SERPL-SCNC: 137 MMOL/L (ref 136–145)
SPECIMEN SITE: ABNORMAL
TROPONIN-HIGH SENSITIVITY: 5 NG/L (ref 0–51)
VENTRICULAR RATE, ECG03: 83 BPM
WBC # BLD AUTO: 6.7 K/UL (ref 3.6–11)

## 2022-11-07 PROCEDURE — 74011250637 HC RX REV CODE- 250/637: Performed by: STUDENT IN AN ORGANIZED HEALTH CARE EDUCATION/TRAINING PROGRAM

## 2022-11-07 PROCEDURE — 85025 COMPLETE CBC W/AUTO DIFF WBC: CPT

## 2022-11-07 PROCEDURE — 99285 EMERGENCY DEPT VISIT HI MDM: CPT

## 2022-11-07 PROCEDURE — 36415 COLL VENOUS BLD VENIPUNCTURE: CPT

## 2022-11-07 PROCEDURE — 82947 ASSAY GLUCOSE BLOOD QUANT: CPT

## 2022-11-07 PROCEDURE — 71045 X-RAY EXAM CHEST 1 VIEW: CPT

## 2022-11-07 PROCEDURE — 84484 ASSAY OF TROPONIN QUANT: CPT

## 2022-11-07 PROCEDURE — 93005 ELECTROCARDIOGRAM TRACING: CPT

## 2022-11-07 PROCEDURE — 74011250636 HC RX REV CODE- 250/636: Performed by: STUDENT IN AN ORGANIZED HEALTH CARE EDUCATION/TRAINING PROGRAM

## 2022-11-07 PROCEDURE — 80053 COMPREHEN METABOLIC PANEL: CPT

## 2022-11-07 PROCEDURE — 83880 ASSAY OF NATRIURETIC PEPTIDE: CPT

## 2022-11-07 PROCEDURE — 96374 THER/PROPH/DIAG INJ IV PUSH: CPT

## 2022-11-07 RX ORDER — KETOROLAC TROMETHAMINE 30 MG/ML
15 INJECTION, SOLUTION INTRAMUSCULAR; INTRAVENOUS
Status: COMPLETED | OUTPATIENT
Start: 2022-11-07 | End: 2022-11-07

## 2022-11-07 RX ORDER — GUAIFENESIN 100 MG/5ML
200 SOLUTION ORAL
Status: COMPLETED | OUTPATIENT
Start: 2022-11-07 | End: 2022-11-07

## 2022-11-07 RX ADMIN — GUAIFENESIN 200 MG: 200 SOLUTION ORAL at 08:46

## 2022-11-07 RX ADMIN — KETOROLAC TROMETHAMINE 15 MG: 30 INJECTION, SOLUTION INTRAMUSCULAR; INTRAVENOUS at 08:46

## 2022-11-07 NOTE — ED PROVIDER NOTES
EMERGENCY DEPARTMENT HISTORY AND PHYSICAL EXAM      Date: 11/7/2022  Patient Name: Keren Del Real    History of Presenting Illness     Chief Complaint   Patient presents with    Shortness of Breath     Pt ambulatory into triage with a  cc of shortness of breath x 1 day; pt states she tested positive for covid on Saturday; pt also states that she fell down in the parking lot getting out of her car; pt appears SOB in triage; O2 100 % in triage     Fall     Pt fell outside in the parking lot getting out of the car; states she hit her head and leg; does not take blood thinners        History Provided By: Patient    HPI: Keren Del Real, 78 y.o. female with a past medical history significant for medical problems as stated below presents to the ED with cc of shortness of breath. She notes she started any symptoms 2 days ago. She started having shortness of breath and a cough that is nonproductive. She notes she was tested positive for COVID at that time. She notes she has lost her taste has had difficulty eating but is able to tolerate food and drink with no vomiting or nausea. Has been constipated for 2 days and took a stool softener with no relief. She notes chest wall pain and rib pain from the coughing but denies any chest pain at rest.  Note she is more fatigued and breathing more heavily. Denies any fever but she has chills and sweats. Denies abdominal pain. Notably while getting out of her car on the way here she felt weak and started to fall. Her head grazed the side of her car and she landed on her right hip. She notes the pain is fine and she did not lose consciousness. She denies any dizziness, changes in her vision. She is not on any immunosuppressant or blood thinners. She does smoke. There are no associated symptoms. No other exacerbating or ameliorating factors. PCP: Donzell Saint., MD    No current facility-administered medications on file prior to encounter.      Current Outpatient Medications on File Prior to Encounter   Medication Sig Dispense Refill    OneTouch Ultra Test strip USE AS DIRECTED BY PRESCRIBER TWICE DAILY      ketorolac (ACULAR) 0.5 % ophthalmic solution       ofloxacin (FLOXIN) 0.3 % ophthalmic solution INSTILL 1 DROP TO SURGERY EYE FOUR TIMES DAILY. START 2 DAYS BEFORE SURGERY      prednisoLONE acetate (PRED FORTE) 1 % ophthalmic suspension SHAKE LIQUID AND INSTILL 1 DROP IN BOTH EYES FOUR TIMES DAILY      rosuvastatin (Crestor) 40 mg tablet Take 40 mg by mouth nightly. lipase-protease-amylase (Creon) 12,000-38,000 -60,000 unit capsule Take 3 Caps by mouth three (3) times daily (with meals). Or 3 pills daily      dicyclomine (BENTYL) 20 mg tablet Take 1 Tab by mouth every six (6) hours. (Patient taking differently: Take 20 mg by mouth every six (6) hours as needed.) 10 Tab 0    lisinopril (PRINIVIL, ZESTRIL) 10 mg tablet Take 20 mg by mouth daily. insulin glargine U-300 conc (TOUJEO SOLOSTAR U-300 INSULIN) 300 unit/mL (1.5 mL) inpn 44 Units by SubCUTAneous route daily. FLUoxetine (PROZAC) 20 mg tablet Take 60 mg by mouth daily. metFORMIN (GLUCOPHAGE) 500 mg tablet Take 500 mg by mouth two (2) times daily (with meals). gabapentin (NEURONTIN) 400 mg capsule Take 600 mg by mouth two (2) times a day. clonazepam (KLONOPIN) 2 mg tablet Take 4 mg by mouth nightly.          Past History     Past Medical History:  Past Medical History:   Diagnosis Date    Arm weakness 10/23/2021    Cancer (HCC)     SKIN-LABIA    Chronic pain     Complete rotator cuff tear of left shoulder 10/23/2021    Complete tear of right rotator cuff 10/23/2021    Depression with anxiety     Diabetes ([facility] [address]     type 2    GERD (gastroesophageal reflux disease)     High cholesterol     Labial lesion     precancerous, resected    Left shoulder pain 10/23/2021    Multiple sclerosis ([facility] [address]     Psychiatric disorder     DEPRESSION/ ANXIETY    Right shoulder pain 10/23/2021    S/P rotator cuff repair 10/23/2021    Tobacco abuse        Past Surgical History:  Past Surgical History:   Procedure Laterality Date    HX APPENDECTOMY      HX  SECTION      HX CHOLECYSTECTOMY      HX COLONOSCOPY      HX HERNIA REPAIR      HX HYSTERECTOMY      UPPER GI ENDOSCOPY,BIOPSY  2019         UPPER GI ENDOSCOPY,BIOPSY  2021            Family History:  Family History   Problem Relation Age of Onset    Heart Disease Father     Asthma Father     Diabetes Father     Elevated Lipids Father     Hypertension Father     Alcohol abuse Father     Diabetes Mother     Parkinsonism Mother     COPD Mother     OSTEOARTHRITIS Mother     Asthma Mother     Heart Disease Mother     Elevated Lipids Mother     Hypertension Mother     Breast Cancer Paternal Grandmother         over 48    Depression Sister     Diabetes Sister     Heart Disease Sister     Depression Brother     Alcohol abuse Brother     Anesth Problems Neg Hx        Social History:  Social History     Tobacco Use    Smoking status: Every Day     Packs/day: 0.50     Years: 35.00     Pack years: 17.50     Types: Cigarettes    Smokeless tobacco: Never   Vaping Use    Vaping Use: Never used   Substance Use Topics    Alcohol use: No    Drug use: No       Allergies: Allergies   Allergen Reactions    Pcn [Penicillins] Rash         Review of Systems   Review of Systems   Constitutional:  Positive for diaphoresis. Negative for activity change, appetite change and fever. HENT:  Negative for congestion. Eyes:  Negative for visual disturbance. Respiratory:  Positive for cough and shortness of breath. Negative for wheezing. Cardiovascular:  Positive for chest pain. Negative for palpitations and leg swelling. Gastrointestinal:  Positive for constipation. Negative for abdominal pain, diarrhea, nausea and vomiting. Endocrine: Negative for polyuria. Genitourinary:  Negative for difficulty urinating and dysuria.    Musculoskeletal:  Negative for back pain and myalgias. Skin:  Positive for wound. Negative for rash. Allergic/Immunologic: Positive for immunocompromised state. Neurological:  Negative for dizziness and headaches. Physical Exam   Physical Exam  Constitutional:       Appearance: Normal appearance. HENT:      Head: Normocephalic and atraumatic. Nose: Nose normal.      Mouth/Throat:      Mouth: Mucous membranes are moist.   Eyes:      Extraocular Movements: Extraocular movements intact. Conjunctiva/sclera: Conjunctivae normal.      Pupils: Pupils are equal, round, and reactive to light. Cardiovascular:      Rate and Rhythm: Normal rate and regular rhythm. Heart sounds: Normal heart sounds. Pulmonary:      Effort: Pulmonary effort is normal.      Breath sounds: Normal breath sounds. No decreased breath sounds, wheezing, rhonchi or rales. Chest:      Chest wall: No mass or tenderness. Abdominal:      General: Abdomen is flat. Palpations: Abdomen is soft. Musculoskeletal:         General: No swelling or deformity. Normal range of motion. Cervical back: Normal range of motion. Skin:     General: Skin is warm. Comments: Small abrasion over right greater trochanter   Neurological:      General: No focal deficit present. Mental Status: She is alert and oriented to person, place, and time. Mental status is at baseline. Psychiatric:         Mood and Affect: Mood normal.         Thought Content: Thought content normal.      Comments: Patient is very anxious. At times breathing very heavily       Diagnostic Study Results     Labs -     No results found for this or any previous visit (from the past 24 hour(s)).       Radiologic Studies -   XR CHEST PORT   Final Result      No acute process on portable chest.           CT Results  (Last 48 hours)      None          CXR Results  (Last 48 hours)                 11/07/22 0842  XR CHEST PORT Final result    Impression:      No acute process on portable chest. Narrative:  EXAM:  XR CHEST PORT       INDICATION: Shortness of breath       COMPARISON: 5/14/2015       TECHNIQUE: portable chest AP view       FINDINGS: The cardiac silhouette is within normal limits. The pulmonary   vasculature is within normal limits. The lungs and pleural spaces are clear. Degenerative changes are seen in the   thoracic spine. Medical Decision Making   I am the first provider for this patient. I reviewed the vital signs, available nursing notes, past medical history, past surgical history, family history and social history. Vital Signs-Reviewed the patient's vital signs. No data found. Records Reviewed: Nursing records and medical records reviewed    MDM:  DDx includes COVID, pneumonia, hypercapnia, hypoxia, anxiety, diabetes    Provider Notes (Medical Decision Making):   Six 11year-old female with history of diabetes and MS not on immunosuppression presents with shortness of breath and fall. Her vital signs were markedly stable on arrival satting 97 100% on room air with respiratory rate of 18. She is afebrile with a blood pressure 122/86 and her heart rate is normal as well. She does have a home positive COVID test on Saturday. She appears notably anxious. She has remarkably clear lungs and a normal heart exam as well. She did fall and is a small abrasion of her right hip she has full range of motion and no signs of bony damage. She did states she hit her head but she is no loss of consciousness and no signs of trauma. Risk outweigh benefits with obtaining head CT and bony imaging after her fall. Obtain basic labs to evaluate for any abnormalities as well as chest x-ray evaluate for overlying bacterial superinfection. ED Course:   Initial assessment performed. The patients presenting problems have been discussed, and they are in agreement with the care plan formulated and outlined with them.   I have encouraged them to ask questions as they arise throughout their visit. ED Course as of 11/08/22 2137   [facility] Nov 07, 2022   1010 Chest x-ray negative for any acute pathology. VBG shows marked alkalosis as patient is breathing very heavy. She is very anxious and even tearful when she notes her pharmacy has recently been closed. We will send Mucinex DM to her pharmacy with return precautions. [JS]      ED Course User Index  [JS] Júnior Reyna MD               Disposition:  Discharge Note:  9:37 PM  The patient has been re-evaluated and is ready for discharge. Reviewed available results with patient. Counseled patient on diagnosis and care plan. Patient has expressed understanding, and all questions have been answered. Patient agrees with plan and agrees to follow up as recommended, or to return to the ED if their symptoms worsen. Discharge instructions have been provided and explained to the patient, along with reasons to return to the ED. DISCHARGE PLAN:  1. Discharge Medication List as of 11/7/2022 10:26 AM        START taking these medications    Details   guaiFENesin-dextromethorphan SR (Mucinex DM) 600-30 mg per tablet Take 1 Tablet by mouth two (2) times a day for 5 days. , Normal, Disp-10 Tablet, R-0           CONTINUE these medications which have NOT CHANGED    Details   OneTouch Ultra Test strip USE AS DIRECTED BY PRESCRIBER TWICE DAILY, Historical Med, KP      ketorolac (ACULAR) 0.5 % ophthalmic solution Historical Med      ofloxacin (FLOXIN) 0.3 % ophthalmic solution INSTILL 1 DROP TO SURGERY EYE FOUR TIMES DAILY. START 2 DAYS BEFORE SURGERY, Historical Med      prednisoLONE acetate (PRED FORTE) 1 % ophthalmic suspension SHAKE LIQUID AND INSTILL 1 DROP IN BOTH EYES FOUR TIMES DAILY, Historical Med      rosuvastatin (Crestor) 40 mg tablet Take 40 mg by mouth nightly., Historical Med      lipase-protease-amylase (Creon) 12,000-38,000 -60,000 unit capsule Take 3 Caps by mouth three (3) times daily (with meals).  Or 3 pills daily, Historical Med      dicyclomine (BENTYL) 20 mg tablet Take 1 Tab by mouth every six (6) hours. , Print, Disp-10 Tab, R-0      lisinopril (PRINIVIL, ZESTRIL) 10 mg tablet Take 20 mg by mouth daily. , Historical Med      insulin glargine U-300 conc (TOUJEO SOLOSTAR U-300 INSULIN) 300 unit/mL (1.5 mL) inpn 44 Units by SubCUTAneous route daily. , Historical Med      FLUoxetine (PROZAC) 20 mg tablet Take 60 mg by mouth daily. , Historical Med      metFORMIN (GLUCOPHAGE) 500 mg tablet Take 500 mg by mouth two (2) times daily (with meals). , Historical Med      gabapentin (NEURONTIN) 400 mg capsule Take 600 mg by mouth two (2) times a day., Historical Med      clonazepam (KLONOPIN) 2 mg tablet Take 4 mg by mouth nightly., Historical Med           2. Follow-up Information       Follow up With Specialties Details Why Contact Info    Donzell Saint., MD Family Medicine In 1 week  [facility] [place] [address]  [phone]      \[facility]\"" EMERGENCY DEPT Emergency Medicine  If symptoms worsen [address] [place] [address]  [address] [facility]  [phone]          3. Return to ED if worse     Diagnosis     Clinical Impression:   1. COVID        Attestations:    Yaritza Montanez MD    Please note that this dictation was completed with [employer], the computer voice recognition software. Quite often unanticipated grammatical, syntax, homophones, and other interpretive errors are inadvertently transcribed by the computer software. Please disregard these errors. Please excuse any errors that have escaped final proofreading. Thank you.

## 2023-06-19 ENCOUNTER — ANESTHESIA (OUTPATIENT)
Facility: HOSPITAL | Age: 68
End: 2023-06-19
Payer: MEDICARE

## 2023-06-19 ENCOUNTER — HOSPITAL ENCOUNTER (OUTPATIENT)
Facility: HOSPITAL | Age: 68
Setting detail: OUTPATIENT SURGERY
Discharge: HOME | End: 2023-06-19
Attending: INTERNAL MEDICINE | Admitting: INTERNAL MEDICINE
Payer: MEDICARE

## 2023-06-19 ENCOUNTER — ANESTHESIA EVENT (OUTPATIENT)
Facility: HOSPITAL | Age: 68
End: 2023-06-19
Payer: MEDICARE

## 2023-06-19 VITALS
HEIGHT: 69 IN | RESPIRATION RATE: 17 BRPM | SYSTOLIC BLOOD PRESSURE: 132 MMHG | BODY MASS INDEX: 26.39 KG/M2 | DIASTOLIC BLOOD PRESSURE: 64 MMHG | WEIGHT: 178.2 LBS | HEART RATE: 57 BPM | TEMPERATURE: 97.5 F | OXYGEN SATURATION: 96 %

## 2023-06-19 LAB
GLUCOSE BLD STRIP.AUTO-MCNC: 120 MG/DL (ref 65–117)
SERVICE CMNT-IMP: ABNORMAL

## 2023-06-19 PROCEDURE — 3700000001 HC ADD 15 MINUTES (ANESTHESIA): Performed by: INTERNAL MEDICINE

## 2023-06-19 PROCEDURE — 88305 TISSUE EXAM BY PATHOLOGIST: CPT

## 2023-06-19 PROCEDURE — 3600007512: Performed by: INTERNAL MEDICINE

## 2023-06-19 PROCEDURE — 6360000002 HC RX W HCPCS: Performed by: NURSE ANESTHETIST, CERTIFIED REGISTERED

## 2023-06-19 PROCEDURE — 88313 SPECIAL STAINS GROUP 2: CPT

## 2023-06-19 PROCEDURE — 2500000003 HC RX 250 WO HCPCS: Performed by: NURSE ANESTHETIST, CERTIFIED REGISTERED

## 2023-06-19 PROCEDURE — 2709999900 HC NON-CHARGEABLE SUPPLY: Performed by: INTERNAL MEDICINE

## 2023-06-19 PROCEDURE — 3600007502: Performed by: INTERNAL MEDICINE

## 2023-06-19 PROCEDURE — 2580000003 HC RX 258: Performed by: NURSE ANESTHETIST, CERTIFIED REGISTERED

## 2023-06-19 PROCEDURE — 7100000010 HC PHASE II RECOVERY - FIRST 15 MIN: Performed by: INTERNAL MEDICINE

## 2023-06-19 PROCEDURE — 3700000000 HC ANESTHESIA ATTENDED CARE: Performed by: INTERNAL MEDICINE

## 2023-06-19 PROCEDURE — 82962 GLUCOSE BLOOD TEST: CPT

## 2023-06-19 PROCEDURE — 7100000011 HC PHASE II RECOVERY - ADDTL 15 MIN: Performed by: INTERNAL MEDICINE

## 2023-06-19 RX ORDER — LORATADINE 10 MG/1
10 CAPSULE, LIQUID FILLED ORAL DAILY
COMMUNITY

## 2023-06-19 RX ORDER — ASCORBIC ACID 500 MG
500 TABLET ORAL DAILY
COMMUNITY

## 2023-06-19 RX ORDER — LIDOCAINE HYDROCHLORIDE 20 MG/ML
INJECTION, SOLUTION EPIDURAL; INFILTRATION; INTRACAUDAL; PERINEURAL PRN
Status: DISCONTINUED | OUTPATIENT
Start: 2023-06-19 | End: 2023-06-19 | Stop reason: SDUPTHER

## 2023-06-19 RX ORDER — SODIUM CHLORIDE 9 MG/ML
INJECTION, SOLUTION INTRAVENOUS CONTINUOUS PRN
Status: DISCONTINUED | OUTPATIENT
Start: 2023-06-19 | End: 2023-06-19 | Stop reason: SDUPTHER

## 2023-06-19 RX ORDER — ERGOCALCIFEROL 1.25 MG/1
50000 CAPSULE ORAL WEEKLY
COMMUNITY

## 2023-06-19 RX ORDER — SODIUM CHLORIDE 9 MG/ML
25 INJECTION, SOLUTION INTRAVENOUS PRN
Status: DISCONTINUED | OUTPATIENT
Start: 2023-06-19 | End: 2023-06-19 | Stop reason: HOSPADM

## 2023-06-19 RX ORDER — SODIUM CHLORIDE 0.9 % (FLUSH) 0.9 %
5-40 SYRINGE (ML) INJECTION EVERY 12 HOURS SCHEDULED
Status: DISCONTINUED | OUTPATIENT
Start: 2023-06-19 | End: 2023-06-19 | Stop reason: HOSPADM

## 2023-06-19 RX ORDER — SODIUM CHLORIDE 0.9 % (FLUSH) 0.9 %
5-40 SYRINGE (ML) INJECTION PRN
Status: DISCONTINUED | OUTPATIENT
Start: 2023-06-19 | End: 2023-06-19 | Stop reason: HOSPADM

## 2023-06-19 RX ADMIN — SODIUM CHLORIDE: 9 INJECTION, SOLUTION INTRAVENOUS at 14:09

## 2023-06-19 RX ADMIN — PROPOFOL 50 MG: 10 INJECTION, EMULSION INTRAVENOUS at 14:16

## 2023-06-19 RX ADMIN — LIDOCAINE HYDROCHLORIDE 60 MG: 20 INJECTION, SOLUTION EPIDURAL; INFILTRATION; INTRACAUDAL; PERINEURAL at 14:10

## 2023-06-19 RX ADMIN — PROPOFOL 100 MG: 10 INJECTION, EMULSION INTRAVENOUS at 14:10

## 2023-06-19 ASSESSMENT — LIFESTYLE VARIABLES: SMOKING_STATUS: 1

## 2023-06-19 ASSESSMENT — PAIN - FUNCTIONAL ASSESSMENT: PAIN_FUNCTIONAL_ASSESSMENT: NONE - DENIES PAIN

## 2023-06-19 NOTE — DISCHARGE INSTRUCTIONS
Keiry Chase  [id number]  1955    COLON DISCHARGE INSTRUCTIONS  Discomfort:  Redness at IV site- apply warm compress to area; if redness or soreness persist- contact your physician  There may be a slight amount of blood passed from the rectum  Gaseous discomfort- walking, belching will help relieve any discomfort  You may not operate a vehicle for 12 hours  You may not engage in an occupation involving machinery or appliances for rest of today  You may not drink alcoholic beverages for at least 12 hours  Avoid making any critical decisions for at least 24 hour  DIET:   High fiber diet. - however -  remember your colon is empty and a heavy meal will produce gas. Avoid these foods:  vegetables, fried / greasy foods, carbonated drinks for today  MEDICATION:  (See attached)     ACTIVITY:  You may not resume your normal daily activities until tomorrow AM; it is recommended that you spend the remainder of the day resting -  avoid any strenuous activity. CALL M.D. ANY SIGN OF:   Increasing pain, nausea, vomiting  Abdominal distension (swelling)  New increased bleeding (oral or rectal)  Fever (chills)  Pain in chest area  Bloody discharge from nose or mouth  Shortness of breath    IMPRESSION:  -- normal and healthy colon lining, with some SLIGHT swelling or edema present. This can just be from the bowel preparation, but it MAY also be a clue about the diarrhea. I biopsied the lining to find out  -- the bowel preparation wasn't perfect, there was still some stool in the colon  -- we saw some hemorrhoids, which are very common, and these are swollen veins at the anal canal or end of the rectum, which can bulge with constipation and straining or frequent bowel movements. Sometimes they cause bleeding, burning, pressure, or even pain. A diet high in fiber helps, but using a daily fiber supplement (like 2 teaspoons of psyllium husk powder or metamucil) can help treat these.     Follow-up Instructions:   Call Dr. Tj Chandler

## 2023-06-19 NOTE — OP NOTE
NAME:  Niall Diaz   :   1955   MRN:   [id number]     Date/Time:  2023 2:08 PM    Colonoscopy Operative Report    Procedure Type:  Colonoscopy with biopsy     Indications:  diarrhea  Pre-operative Diagnosis: see indication above  Post-operative Diagnosis:  See findings below  :  Rocio Valdovinos MD  Referring Provider: -Jeaneth Herrera MD    Exam:  Airway: clear, no airway problems anticipated  Heart: RRR, without gallops or rubs  Lungs: clear bilaterally without wheezes, crackles, or rhonchi  Abdomen: soft, nontender, nondistended, bowel sounds present  Mental Status: awake, alert and oriented to person, place and time    Sedation:  MAC anesthesia Propofol  Procedure Details:  After informed consent was obtained with all risks and benefits of procedure explained and preoperative exam completed, the patient was taken to the endoscopy suite and placed in the left lateral decubitus position. Upon sequential sedation as per above, a digital rectal exam was performed demonstrating internal and external hemorrhoids. The Olympus videocolonoscope  was inserted in the rectum and carefully advanced to the terminal ileum. The quality of preparation was fair. The colonoscope was slowly withdrawn with careful evaluation between folds. Retroflexion in the rectum was completed demonstrating internal and external hemorrhoids. Findings:   ANUS: Anal exam reveals no masses but hemorrhoids, sphincter tone is normal.   RECTUM: Rectal exam reveals no masses but hemorrhoids. SIGMOID COLON: The mucosa is normal with good vascular pattern and without ulcers, diverticula, and polyps. DESCENDING COLON: The mucosa is normal with good vascular pattern and without ulcers, diverticula, and polyps. TRANSVERSE COLON: The mucosa is normal with good vascular pattern and without ulcers, diverticula, and polyps.    ASCENDING COLON: The mucosa is normal with good vascular pattern and without ulcers, diverticula,

## 2023-06-19 NOTE — PROGRESS NOTES
Endoscopy recovery  Patient returned to baseline, vital signs stable (see vital sign flowsheet). Patient offered liquids, refused, states she has drink in the car. Respiratory status within defined limits. Abdomen soft not tender. Skin with in defined limits. Responsible party driving patient home was given the opportunity to ask questions. Patient discharged with documented belongings.

## 2023-06-19 NOTE — H&P
Gastroenterology Outpatient History and Physical    Patient: Roberto Ruano    Physician: Edd Gonzalez MD    Chief Complaint: diarrhea  History of Present Illness: 77 yo F with diarrhea.     History:  Past Medical History:   Diagnosis Date    Arm weakness 10/23/2021    Cancer (HCC)     SKIN-LABIA    Chronic pain     Complete rotator cuff tear of left shoulder 10/23/2021    Complete tear of right rotator cuff 10/23/2021    Depression with anxiety     Diabetes (HCC)     type 2    GERD (gastroesophageal reflux disease)     High cholesterol     Labial lesion     precancerous, resected    Left shoulder pain 10/23/2021    Multiple sclerosis ([address]     Psychiatric disorder     DEPRESSION/ ANXIETY    Right shoulder pain 10/23/2021    S/P rotator cuff repair 10/23/2021    Tobacco abuse       Past Surgical History:   Procedure Laterality Date    APPENDECTOMY       SECTION      CHOLECYSTECTOMY      COLONOSCOPY      HERNIA REPAIR      HYSTERECTOMY (CERVIX STATUS UNKNOWN)      UPPER GI ENDOSCOPY,BIOPSY  2021         UPPER GI ENDOSCOPY,BIOPSY  2019           Social History     Socioeconomic History    Marital status:      Spouse name: None    Number of children: None    Years of education: None    Highest education level: None   Tobacco Use    Smoking status: Every Day     Packs/day: 0.25     Years: 35.00     Pack years: 8.75     Types: Cigarettes    Smokeless tobacco: Never   Substance and Sexual Activity    Alcohol use: No    Drug use: No      Family History   Problem Relation Age of Onset    Anesth Problems Neg Hx     Alcohol Abuse Brother     Depression Brother     Heart Disease Sister     Diabetes Sister     Depression Sister     Breast Cancer Paternal Grandmother         over 48    Hypertension Mother     Elevated Lipids Mother     Heart Disease Mother     Asthma Mother     Osteoarthritis Mother     COPD Mother     Parkinsonism Mother     Diabetes Mother     Alcohol Abuse Father     Heart Disease

## 2023-06-19 NOTE — ANESTHESIA PRE PROCEDURE
Department of Anesthesiology  Preprocedure Note       Name:  Sarath Gayle   Age:  78 y.o.  :  1955                                          MRN:  [id number]         Date:  2023      Surgeon: Aye Louis):  Moises So MD    Procedure: Procedure(s):  COLONOSCOPY WITH BIOPSY/POLYP    Medications prior to admission:   Prior to Admission medications    Medication Sig Start Date End Date Taking? Authorizing Provider   clonazePAM (KLONOPIN) 2 MG tablet Take 2 tablets by mouth nightly as needed. Ar Automatic Reconciliation   FLUoxetine HCl, PMDD, 20 MG TABS Take 60 mg by mouth daily    Ar Automatic Reconciliation   gabapentin (NEURONTIN) 400 MG capsule Take 600 mg by mouth 2 times daily. Ar Automatic Reconciliation   insulin glargine, 1 unit dial, (TOUJEO) 300 UNIT/ML concentrated injection pen Inject 48 Units into the skin daily    Ar Automatic Reconciliation   ketorolac (ACULAR) 0.5 % ophthalmic solution ceived the following from [facility]: Outside name: ketorolac (ACULAR) 0.5 % ophthalmic solution 21   Ar Automatic Reconciliation   lisinopril (PRINIVIL;ZESTRIL) 20 MG tablet Take 1 tablet by mouth daily    Ar Automatic Reconciliation   metFORMIN (GLUCOPHAGE) 500 MG tablet Take 500 mg by mouth 2 times daily (with meals)    Ar Automatic Reconciliation   rosuvastatin (CRESTOR) 40 MG tablet Take 40 mg by mouth    Ar Automatic Reconciliation       Current medications:    No current facility-administered medications for this encounter. Current Outpatient Medications   Medication Sig Dispense Refill    clonazePAM (KLONOPIN) 2 MG tablet Take 2 tablets by mouth nightly as needed.  FLUoxetine HCl, PMDD, 20 MG TABS Take 60 mg by mouth daily      gabapentin (NEURONTIN) 400 MG capsule Take 600 mg by mouth 2 times daily.       insulin glargine, 1 unit dial, (TOUJEO) 300 UNIT/ML concentrated injection pen Inject 48 Units into the skin daily      ketorolac (ACULAR) 0.5 % ophthalmic

## 2023-06-19 NOTE — PROGRESS NOTES
Endoscopy Case End Note:    1428:  Procedure scope was pre-cleaned, per protocol, at bedside by NOE Sanz SA.      1430:  Report received from anesthesia - CHARLENE Taylor CRNA. See anesthesia flowsheet for intra-procedure vital signs and events.

## 2023-06-20 NOTE — ANESTHESIA POSTPROCEDURE EVALUATION
Department of Anesthesiology  Postprocedure Note    Patient: Carissa Fisher  MRN: [id number]  YOB: 1955  Date of evaluation: 6/20/2023      Procedure Summary     Date: 06/19/23 Room / Location: [facility] ENDO 04 / [facility] ENDOSCOPY    Anesthesia Start: 1410 Anesthesia Stop: 1628    Procedure: COLONOSCOPY WITH BIOPSY/POLYP (Upper GI Region) Diagnosis:       Diarrhea, unspecified type      Epigastric pain      (Diarrhea, unspecified type [R19.7])      (Epigastric pain [R10.13])    Surgeons: Isi Delacruz MD Responsible Provider: Ferdinand Amaro MD    Anesthesia Type: MAC ASA Status: 3          Anesthesia Type: No value filed.     Kenzie Phase I: Kenzie Score: 10    Kenzie Phase II: Kenzie Score: 10      Anesthesia Post Evaluation    Patient location during evaluation: PACU  Patient participation: complete - patient participated  Level of consciousness: sleepy but conscious and responsive to verbal stimuli  Airway patency: patent  Nausea & Vomiting: no vomiting and no nausea  Complications: no  Cardiovascular status: blood pressure returned to baseline and hemodynamically stable  Respiratory status: acceptable  Hydration status: stable

## (undated) DEVICE — 1200 GUARD II KIT W/5MM TUBE W/O VAC TUBE: Brand: GUARDIAN

## (undated) DEVICE — Device

## (undated) DEVICE — SET ADMIN 16ML TBNG L100IN 2 Y INJ SITE IV PIGGY BK DISP

## (undated) DEVICE — BAG SPEC BIOHZRD 10 X 10 IN --

## (undated) DEVICE — NEEDLE HYPO 18GA L1.5IN PNK S STL HUB POLYPR SHLD REG BVL

## (undated) DEVICE — BASIN EMSIS 16OZ GRAPHITE PLAS KID SHP MOLD GRAD FOR ORAL

## (undated) DEVICE — SYR 3ML LL TIP 1/10ML GRAD --

## (undated) DEVICE — SOLIDIFIER FLD 2OZ 1500CC N DISINF IN BTL DISP SAFESORB

## (undated) DEVICE — MEDI-VAC YANK SUCT HNDL W/TPRD BULBOUS TIP: Brand: CARDINAL HEALTH

## (undated) DEVICE — YANKAUER,TAPERED BULBOUS TIP,W/O VENT: Brand: MEDLINE

## (undated) DEVICE — FORCEPS BX L160CM DIA8MM GRSP DISECT CUP TIP NONLOCKING ROT

## (undated) DEVICE — SINGLE-USE BIOPSY FORCEPS: Brand: RADIAL JAW 4

## (undated) DEVICE — CATH IV AUTOGRD BC PNK 20GA 25 -- INSYTE

## (undated) DEVICE — ENDOSCOPIC KIT COMPLIANCE ENDOKIT

## (undated) DEVICE — BLOCK BITE ENDOSCP AD 21 MM W/ DIL BLU LF DISP

## (undated) DEVICE — CONTAINER SPEC 20 ML LID NEUT BUFF FORMALIN 10 % POLYPR STS

## (undated) DEVICE — NEONATAL-ADULT SPO2 SENSOR: Brand: NELLCOR

## (undated) DEVICE — TOWEL 4 PLY TISS 19X30 SUE WHT

## (undated) DEVICE — CUFF BLD PRSS AD CLTH SGL TB W/ BAYNT CONN ROUNDED CORNER

## (undated) DEVICE — ELECTRODE,RADIOTRANSLUCENT,FOAM,5PK: Brand: MEDLINE

## (undated) DEVICE — Z DISCONTINUED PER MEDLINE LINE GAS SAMPLING O2/CO2 LNG AD 13 FT NSL W/ TBNG FILTERLINE

## (undated) DEVICE — IV START KIT: Brand: MEDLINE

## (undated) DEVICE — CATHETER IV 22GA L1IN OD0.8382-0.9144MM ID0.6096-0.6858MM

## (undated) DEVICE — SYR 10ML LUER LOK 1/5ML GRAD --

## (undated) DEVICE — KENDALL RADIOLUCENT FOAM MONITORING ELECTRODE RECTANGULAR SHAPE: Brand: KENDALL

## (undated) DEVICE — SOLIDIFIER MEDC 1200ML -- CONVERT TO 356117